# Patient Record
Sex: FEMALE | Race: BLACK OR AFRICAN AMERICAN | NOT HISPANIC OR LATINO | Employment: FULL TIME | URBAN - METROPOLITAN AREA
[De-identification: names, ages, dates, MRNs, and addresses within clinical notes are randomized per-mention and may not be internally consistent; named-entity substitution may affect disease eponyms.]

---

## 2024-01-06 ENCOUNTER — APPOINTMENT (OUTPATIENT)
Dept: CARDIOLOGY | Facility: HOSPITAL | Age: 24
End: 2024-01-06
Payer: COMMERCIAL

## 2024-01-06 ENCOUNTER — APPOINTMENT (OUTPATIENT)
Dept: RADIOLOGY | Facility: HOSPITAL | Age: 24
End: 2024-01-06
Payer: COMMERCIAL

## 2024-01-06 ENCOUNTER — HOSPITAL ENCOUNTER (OUTPATIENT)
Facility: HOSPITAL | Age: 24
Setting detail: OBSERVATION
Discharge: HOME | End: 2024-01-08
Attending: EMERGENCY MEDICINE
Payer: COMMERCIAL

## 2024-01-06 DIAGNOSIS — O21.0 HYPEREMESIS GRAVIDARUM (HHS-HCC): Primary | ICD-10-CM

## 2024-01-06 DIAGNOSIS — E87.6 HYPOKALEMIA: ICD-10-CM

## 2024-01-06 DIAGNOSIS — O99.891 ASYMPTOMATIC BACTERIURIA IN PREGNANCY (HHS-HCC): ICD-10-CM

## 2024-01-06 DIAGNOSIS — E83.42 HYPOMAGNESEMIA: ICD-10-CM

## 2024-01-06 DIAGNOSIS — R82.71 ASYMPTOMATIC BACTERIURIA IN PREGNANCY (HHS-HCC): ICD-10-CM

## 2024-01-06 DIAGNOSIS — N30.00 ACUTE CYSTITIS WITHOUT HEMATURIA: ICD-10-CM

## 2024-01-06 LAB
ABO GROUP (TYPE) IN BLOOD: NORMAL
ALBUMIN SERPL BCP-MCNC: 3.9 G/DL (ref 3.4–5)
ALP SERPL-CCNC: 51 U/L (ref 33–110)
ALT SERPL W P-5'-P-CCNC: 42 U/L (ref 7–45)
ANION GAP SERPL CALC-SCNC: 18 MMOL/L (ref 10–20)
APPEARANCE UR: ABNORMAL
AST SERPL W P-5'-P-CCNC: 47 U/L (ref 9–39)
B-HCG SERPL-ACNC: ABNORMAL MIU/ML
BACTERIA #/AREA URNS AUTO: ABNORMAL /HPF
BASOPHILS # BLD AUTO: 0.03 X10*3/UL (ref 0–0.1)
BASOPHILS NFR BLD AUTO: 0.3 %
BILIRUB SERPL-MCNC: 0.4 MG/DL (ref 0–1.2)
BILIRUB UR STRIP.AUTO-MCNC: NEGATIVE MG/DL
BUN SERPL-MCNC: 12 MG/DL (ref 6–23)
CALCIUM SERPL-MCNC: 8.9 MG/DL (ref 8.6–10.3)
CARDIAC TROPONIN I PNL SERPL HS: <3 NG/L (ref 0–13)
CHLORIDE SERPL-SCNC: 99 MMOL/L (ref 98–107)
CO2 SERPL-SCNC: 18 MMOL/L (ref 21–32)
COLOR UR: ABNORMAL
CREAT SERPL-MCNC: 0.44 MG/DL (ref 0.5–1.05)
D DIMER PPP FEU-MCNC: 223 NG/ML FEU
EOSINOPHIL # BLD AUTO: 0.01 X10*3/UL (ref 0–0.7)
EOSINOPHIL NFR BLD AUTO: 0.1 %
ERYTHROCYTE [DISTWIDTH] IN BLOOD BY AUTOMATED COUNT: 12.7 % (ref 11.5–14.5)
FLUAV RNA RESP QL NAA+PROBE: NOT DETECTED
FLUBV RNA RESP QL NAA+PROBE: NOT DETECTED
GFR SERPL CREATININE-BSD FRML MDRD: >90 ML/MIN/1.73M*2
GLUCOSE SERPL-MCNC: 155 MG/DL (ref 74–99)
GLUCOSE UR STRIP.AUTO-MCNC: ABNORMAL MG/DL
HCT VFR BLD AUTO: 32.5 % (ref 36–46)
HGB BLD-MCNC: 11.3 G/DL (ref 12–16)
IMM GRANULOCYTES # BLD AUTO: 0.03 X10*3/UL (ref 0–0.7)
IMM GRANULOCYTES NFR BLD AUTO: 0.3 % (ref 0–0.9)
KETONES UR STRIP.AUTO-MCNC: ABNORMAL MG/DL
LEUKOCYTE ESTERASE UR QL STRIP.AUTO: NEGATIVE
LYMPHOCYTES # BLD AUTO: 0.98 X10*3/UL (ref 1.2–4.8)
LYMPHOCYTES NFR BLD AUTO: 9.1 %
MAGNESIUM SERPL-MCNC: 1.5 MG/DL (ref 1.6–2.4)
MCH RBC QN AUTO: 28.4 PG (ref 26–34)
MCHC RBC AUTO-ENTMCNC: 34.8 G/DL (ref 32–36)
MCV RBC AUTO: 82 FL (ref 80–100)
MONOCYTES # BLD AUTO: 0.78 X10*3/UL (ref 0.1–1)
MONOCYTES NFR BLD AUTO: 7.3 %
MUCOUS THREADS #/AREA URNS AUTO: ABNORMAL /LPF
NEUTROPHILS # BLD AUTO: 8.9 X10*3/UL (ref 1.2–7.7)
NEUTROPHILS NFR BLD AUTO: 82.9 %
NITRITE UR QL STRIP.AUTO: NEGATIVE
NRBC BLD-RTO: 0 /100 WBCS (ref 0–0)
PH UR STRIP.AUTO: 5 [PH]
PLATELET # BLD AUTO: 184 X10*3/UL (ref 150–450)
POTASSIUM SERPL-SCNC: 2.7 MMOL/L (ref 3.5–5.3)
PROT SERPL-MCNC: 7.1 G/DL (ref 6.4–8.2)
PROT UR STRIP.AUTO-MCNC: ABNORMAL MG/DL
RBC # BLD AUTO: 3.98 X10*6/UL (ref 4–5.2)
RBC # UR STRIP.AUTO: NEGATIVE /UL
RBC #/AREA URNS AUTO: ABNORMAL /HPF
RH FACTOR (ANTIGEN D): NORMAL
SARS-COV-2 RNA RESP QL NAA+PROBE: NOT DETECTED
SODIUM SERPL-SCNC: 132 MMOL/L (ref 136–145)
SP GR UR STRIP.AUTO: 1.03
SQUAMOUS #/AREA URNS AUTO: ABNORMAL /HPF
UROBILINOGEN UR STRIP.AUTO-MCNC: 4 MG/DL
WBC # BLD AUTO: 10.7 X10*3/UL (ref 4.4–11.3)
WBC #/AREA URNS AUTO: ABNORMAL /HPF

## 2024-01-06 PROCEDURE — 99223 1ST HOSP IP/OBS HIGH 75: CPT

## 2024-01-06 PROCEDURE — 86901 BLOOD TYPING SEROLOGIC RH(D): CPT | Performed by: EMERGENCY MEDICINE

## 2024-01-06 PROCEDURE — 85379 FIBRIN DEGRADATION QUANT: CPT | Performed by: EMERGENCY MEDICINE

## 2024-01-06 PROCEDURE — 96361 HYDRATE IV INFUSION ADD-ON: CPT

## 2024-01-06 PROCEDURE — 87086 URINE CULTURE/COLONY COUNT: CPT | Mod: AHULAB | Performed by: EMERGENCY MEDICINE

## 2024-01-06 PROCEDURE — 2500000005 HC RX 250 GENERAL PHARMACY W/O HCPCS: Performed by: NURSE PRACTITIONER

## 2024-01-06 PROCEDURE — 36415 COLL VENOUS BLD VENIPUNCTURE: CPT | Performed by: EMERGENCY MEDICINE

## 2024-01-06 PROCEDURE — 83735 ASSAY OF MAGNESIUM: CPT | Performed by: EMERGENCY MEDICINE

## 2024-01-06 PROCEDURE — G0378 HOSPITAL OBSERVATION PER HR: HCPCS

## 2024-01-06 PROCEDURE — 85025 COMPLETE CBC W/AUTO DIFF WBC: CPT | Performed by: EMERGENCY MEDICINE

## 2024-01-06 PROCEDURE — 93005 ELECTROCARDIOGRAM TRACING: CPT

## 2024-01-06 PROCEDURE — 84702 CHORIONIC GONADOTROPIN TEST: CPT | Performed by: EMERGENCY MEDICINE

## 2024-01-06 PROCEDURE — 87636 SARSCOV2 & INF A&B AMP PRB: CPT | Performed by: EMERGENCY MEDICINE

## 2024-01-06 PROCEDURE — 96368 THER/DIAG CONCURRENT INF: CPT

## 2024-01-06 PROCEDURE — 2500000001 HC RX 250 WO HCPCS SELF ADMINISTERED DRUGS (ALT 637 FOR MEDICARE OP)

## 2024-01-06 PROCEDURE — 80053 COMPREHEN METABOLIC PANEL: CPT | Performed by: EMERGENCY MEDICINE

## 2024-01-06 PROCEDURE — 84484 ASSAY OF TROPONIN QUANT: CPT | Performed by: EMERGENCY MEDICINE

## 2024-01-06 PROCEDURE — 96375 TX/PRO/DX INJ NEW DRUG ADDON: CPT

## 2024-01-06 PROCEDURE — 2500000001 HC RX 250 WO HCPCS SELF ADMINISTERED DRUGS (ALT 637 FOR MEDICARE OP): Performed by: EMERGENCY MEDICINE

## 2024-01-06 PROCEDURE — 94760 N-INVAS EAR/PLS OXIMETRY 1: CPT

## 2024-01-06 PROCEDURE — 2500000004 HC RX 250 GENERAL PHARMACY W/ HCPCS (ALT 636 FOR OP/ED)

## 2024-01-06 PROCEDURE — 96366 THER/PROPH/DIAG IV INF ADDON: CPT

## 2024-01-06 PROCEDURE — 96365 THER/PROPH/DIAG IV INF INIT: CPT

## 2024-01-06 PROCEDURE — 99285 EMERGENCY DEPT VISIT HI MDM: CPT | Performed by: EMERGENCY MEDICINE

## 2024-01-06 PROCEDURE — 71045 X-RAY EXAM CHEST 1 VIEW: CPT | Mod: FOREIGN READ | Performed by: RADIOLOGY

## 2024-01-06 PROCEDURE — 81001 URINALYSIS AUTO W/SCOPE: CPT | Performed by: EMERGENCY MEDICINE

## 2024-01-06 PROCEDURE — 71045 X-RAY EXAM CHEST 1 VIEW: CPT | Mod: FR

## 2024-01-06 PROCEDURE — 2500000004 HC RX 250 GENERAL PHARMACY W/ HCPCS (ALT 636 FOR OP/ED): Performed by: EMERGENCY MEDICINE

## 2024-01-06 RX ORDER — METOPROLOL TARTRATE 1 MG/ML
5 INJECTION, SOLUTION INTRAVENOUS ONCE
Status: COMPLETED | OUTPATIENT
Start: 2024-01-06 | End: 2024-01-06

## 2024-01-06 RX ORDER — MAGNESIUM SULFATE HEPTAHYDRATE 40 MG/ML
2 INJECTION, SOLUTION INTRAVENOUS ONCE
Status: COMPLETED | OUTPATIENT
Start: 2024-01-06 | End: 2024-01-06

## 2024-01-06 RX ORDER — SODIUM CHLORIDE 9 MG/ML
250 INJECTION, SOLUTION INTRAVENOUS CONTINUOUS
Status: DISCONTINUED | OUTPATIENT
Start: 2024-01-06 | End: 2024-01-07

## 2024-01-06 RX ORDER — FOLIC ACID 1 MG/1
1 TABLET ORAL DAILY
Status: DISCONTINUED | OUTPATIENT
Start: 2024-01-06 | End: 2024-01-08 | Stop reason: HOSPADM

## 2024-01-06 RX ORDER — POTASSIUM CHLORIDE 20 MEQ/1
40 TABLET, EXTENDED RELEASE ORAL ONCE
Status: COMPLETED | OUTPATIENT
Start: 2024-01-06 | End: 2024-01-06

## 2024-01-06 RX ORDER — POTASSIUM CHLORIDE 14.9 MG/ML
20 INJECTION INTRAVENOUS
Status: COMPLETED | OUTPATIENT
Start: 2024-01-06 | End: 2024-01-06

## 2024-01-06 RX ORDER — METOCLOPRAMIDE HYDROCHLORIDE 5 MG/ML
10 INJECTION INTRAMUSCULAR; INTRAVENOUS EVERY 6 HOURS PRN
Status: DISCONTINUED | OUTPATIENT
Start: 2024-01-06 | End: 2024-01-08 | Stop reason: HOSPADM

## 2024-01-06 RX ORDER — CEPHALEXIN 500 MG/1
500 CAPSULE ORAL ONCE
Status: COMPLETED | OUTPATIENT
Start: 2024-01-06 | End: 2024-01-06

## 2024-01-06 RX ORDER — CEPHALEXIN 500 MG/1
500 CAPSULE ORAL EVERY 6 HOURS SCHEDULED
Status: DISCONTINUED | OUTPATIENT
Start: 2024-01-06 | End: 2024-01-07

## 2024-01-06 RX ORDER — METOCLOPRAMIDE 10 MG/1
10 TABLET ORAL EVERY 6 HOURS PRN
Status: DISCONTINUED | OUTPATIENT
Start: 2024-01-06 | End: 2024-01-08 | Stop reason: HOSPADM

## 2024-01-06 RX ORDER — ENOXAPARIN SODIUM 100 MG/ML
40 INJECTION SUBCUTANEOUS EVERY 24 HOURS
Status: DISCONTINUED | OUTPATIENT
Start: 2024-01-06 | End: 2024-01-07

## 2024-01-06 RX ORDER — ACETAMINOPHEN 325 MG/1
650 TABLET ORAL EVERY 6 HOURS PRN
Status: DISCONTINUED | OUTPATIENT
Start: 2024-01-06 | End: 2024-01-08 | Stop reason: HOSPADM

## 2024-01-06 RX ORDER — METOCLOPRAMIDE HYDROCHLORIDE 5 MG/ML
10 INJECTION INTRAMUSCULAR; INTRAVENOUS ONCE
Status: COMPLETED | OUTPATIENT
Start: 2024-01-06 | End: 2024-01-06

## 2024-01-06 RX ADMIN — MAGNESIUM SULFATE HEPTAHYDRATE 2 G: 40 INJECTION, SOLUTION INTRAVENOUS at 15:06

## 2024-01-06 RX ADMIN — CEPHALEXIN 500 MG: 500 CAPSULE ORAL at 16:57

## 2024-01-06 RX ADMIN — CEPHALEXIN 500 MG: 500 CAPSULE ORAL at 23:31

## 2024-01-06 RX ADMIN — FOLIC ACID 1 MG: 1 TABLET ORAL at 18:52

## 2024-01-06 RX ADMIN — SODIUM CHLORIDE 100 ML/HR: 9 INJECTION, SOLUTION INTRAVENOUS at 17:45

## 2024-01-06 RX ADMIN — SODIUM CHLORIDE, POTASSIUM CHLORIDE, SODIUM LACTATE AND CALCIUM CHLORIDE 1000 ML: 600; 310; 30; 20 INJECTION, SOLUTION INTRAVENOUS at 15:05

## 2024-01-06 RX ADMIN — POTASSIUM CHLORIDE 20 MEQ: 14.9 INJECTION, SOLUTION INTRAVENOUS at 17:45

## 2024-01-06 RX ADMIN — SODIUM CHLORIDE 100 ML/HR: 9 INJECTION, SOLUTION INTRAVENOUS at 23:31

## 2024-01-06 RX ADMIN — POTASSIUM CHLORIDE 40 MEQ: 1500 TABLET, EXTENDED RELEASE ORAL at 15:06

## 2024-01-06 RX ADMIN — METOPROLOL TARTRATE 5 MG: 5 INJECTION INTRAVENOUS at 23:31

## 2024-01-06 RX ADMIN — POTASSIUM CHLORIDE 20 MEQ: 14.9 INJECTION, SOLUTION INTRAVENOUS at 15:05

## 2024-01-06 RX ADMIN — METOCLOPRAMIDE 10 MG: 5 INJECTION, SOLUTION INTRAMUSCULAR; INTRAVENOUS at 13:28

## 2024-01-06 RX ADMIN — SODIUM CHLORIDE, POTASSIUM CHLORIDE, SODIUM LACTATE AND CALCIUM CHLORIDE 1000 ML: 600; 310; 30; 20 INJECTION, SOLUTION INTRAVENOUS at 13:28

## 2024-01-06 SDOH — SOCIAL STABILITY: SOCIAL INSECURITY: DO YOU FEEL ANYONE HAS EXPLOITED OR TAKEN ADVANTAGE OF YOU FINANCIALLY OR OF YOUR PERSONAL PROPERTY?: NO

## 2024-01-06 SDOH — SOCIAL STABILITY: SOCIAL INSECURITY: HAVE YOU HAD THOUGHTS OF HARMING ANYONE ELSE?: NO

## 2024-01-06 SDOH — SOCIAL STABILITY: SOCIAL INSECURITY: DO YOU FEEL UNSAFE GOING BACK TO THE PLACE WHERE YOU ARE LIVING?: NO

## 2024-01-06 SDOH — SOCIAL STABILITY: SOCIAL INSECURITY: DOES ANYONE TRY TO KEEP YOU FROM HAVING/CONTACTING OTHER FRIENDS OR DOING THINGS OUTSIDE YOUR HOME?: NO

## 2024-01-06 SDOH — SOCIAL STABILITY: SOCIAL INSECURITY: HAS ANYONE EVER THREATENED TO HURT YOUR FAMILY OR YOUR PETS?: NO

## 2024-01-06 SDOH — SOCIAL STABILITY: SOCIAL INSECURITY: ARE THERE ANY APPARENT SIGNS OF INJURIES/BEHAVIORS THAT COULD BE RELATED TO ABUSE/NEGLECT?: NO

## 2024-01-06 SDOH — SOCIAL STABILITY: SOCIAL INSECURITY: ABUSE: ADULT

## 2024-01-06 SDOH — SOCIAL STABILITY: SOCIAL INSECURITY: ARE YOU OR HAVE YOU BEEN THREATENED OR ABUSED PHYSICALLY, EMOTIONALLY, OR SEXUALLY BY ANYONE?: NO

## 2024-01-06 SDOH — SOCIAL STABILITY: SOCIAL INSECURITY: WERE YOU ABLE TO COMPLETE ALL THE BEHAVIORAL HEALTH SCREENINGS?: YES

## 2024-01-06 ASSESSMENT — VISUAL ACUITY: OU: 1

## 2024-01-06 ASSESSMENT — COLUMBIA-SUICIDE SEVERITY RATING SCALE - C-SSRS
1. IN THE PAST MONTH, HAVE YOU WISHED YOU WERE DEAD OR WISHED YOU COULD GO TO SLEEP AND NOT WAKE UP?: NO
2. HAVE YOU ACTUALLY HAD ANY THOUGHTS OF KILLING YOURSELF?: NO
6. HAVE YOU EVER DONE ANYTHING, STARTED TO DO ANYTHING, OR PREPARED TO DO ANYTHING TO END YOUR LIFE?: NO

## 2024-01-06 ASSESSMENT — COGNITIVE AND FUNCTIONAL STATUS - GENERAL
MOBILITY SCORE: 24
MOBILITY SCORE: 24
DAILY ACTIVITIY SCORE: 24
DAILY ACTIVITIY SCORE: 24
PATIENT BASELINE BEDBOUND: NO

## 2024-01-06 ASSESSMENT — ACTIVITIES OF DAILY LIVING (ADL)
GROOMING: INDEPENDENT
JUDGMENT_ADEQUATE_SAFELY_COMPLETE_DAILY_ACTIVITIES: YES
HEARING - RIGHT EAR: FUNCTIONAL
TOILETING: INDEPENDENT
FEEDING YOURSELF: INDEPENDENT
BATHING: INDEPENDENT
ADEQUATE_TO_COMPLETE_ADL: YES
LACK_OF_TRANSPORTATION: NO
LACK_OF_TRANSPORTATION: NO
DRESSING YOURSELF: INDEPENDENT
PATIENT'S MEMORY ADEQUATE TO SAFELY COMPLETE DAILY ACTIVITIES?: YES
HEARING - LEFT EAR: FUNCTIONAL
WALKS IN HOME: INDEPENDENT

## 2024-01-06 ASSESSMENT — ENCOUNTER SYMPTOMS
APPETITE CHANGE: 1
DIZZINESS: 1
DIARRHEA: 1
ALLERGIC/IMMUNOLOGIC NEGATIVE: 1
HEMATOLOGIC/LYMPHATIC NEGATIVE: 1
NAUSEA: 1
MUSCULOSKELETAL NEGATIVE: 1
ENDOCRINE NEGATIVE: 1
EYES NEGATIVE: 1
PSYCHIATRIC NEGATIVE: 1
SHORTNESS OF BREATH: 1

## 2024-01-06 ASSESSMENT — LIFESTYLE VARIABLES
HOW OFTEN DO YOU HAVE A DRINK CONTAINING ALCOHOL: NEVER
AUDIT-C TOTAL SCORE: 0
PRESCIPTION_ABUSE_PAST_12_MONTHS: NO
HOW MANY STANDARD DRINKS CONTAINING ALCOHOL DO YOU HAVE ON A TYPICAL DAY: PATIENT DOES NOT DRINK
SUBSTANCE_ABUSE_PAST_12_MONTHS: NO
AUDIT-C TOTAL SCORE: 0
HOW OFTEN DO YOU HAVE 6 OR MORE DRINKS ON ONE OCCASION: NEVER
SKIP TO QUESTIONS 9-10: 1

## 2024-01-06 ASSESSMENT — PATIENT HEALTH QUESTIONNAIRE - PHQ9
2. FEELING DOWN, DEPRESSED OR HOPELESS: NOT AT ALL
1. LITTLE INTEREST OR PLEASURE IN DOING THINGS: NOT AT ALL
SUM OF ALL RESPONSES TO PHQ9 QUESTIONS 1 & 2: 0

## 2024-01-06 ASSESSMENT — PAIN SCALES - GENERAL: PAINLEVEL_OUTOF10: 0 - NO PAIN

## 2024-01-06 NOTE — ED PROVIDER NOTES
History/Exam limitations: none.   Additional history was obtained from patient.          HPI:    India Naidu is a 23 y.o. female PMH preeclampsia in prior pregnancy, -0-1-0 with LMP in early November presenting with chest pain, shortness of breath, lightheadedness, fatigue, nausea and vomiting times approximately 2 weeks.  Over the last week she has had progressive shortness of breath, midsternal chest pain.  Initially thought she was having some heartburn and nausea vomiting from pregnancy.  Has been unable to tolerate p.o. over the last 2 weeks.  States that she feels markedly short of breath with very minimal exertion such as just across the room.  No headache, vision changes, hemoptysis, unilateral leg swelling.  Has not had vaginal bleeding.  Is unsure of her exact last menstrual period but states it was early November.  States that she had a miscarriage early  and had preeclampsia with her prior pregnancy but has a healthy daughter.  States that she is considering not moving forward with the pregnancy and seeing OB in 2 days to discuss this.          Physical Exam:  ED Triage Vitals [24 1302]   Temp Heart Rate Resp BP   37 °C (98.6 °F) (!) 122 16 112/78      SpO2 Temp src Heart Rate Source Patient Position   98 % -- -- --      BP Location FiO2 (%)     Left arm --        GEN:      Alert, NAD  Eyes:       PERRL, EOMI  HENT:      NC/AT, OP clear, airway patent, MM  CV:      RRR, no MRG, no LE pitting edema, 2+ radial and pedal pulses  PULM:     CTAB, no w/r/r, easy WOB, symmetric chest rise  ABD:      Soft, NT, ND, no masses, BS +  :       No CVA TTP  NEURO:   A&Ox3, no focal deficits    MSK:      FROM, no joint deformities or swelling, no e/o trauma  SKIN:       Warm and dry  PSYCH:    Appropriate mood and affect         MDM/ED Course:   India Naidu is a 23 y.o. female PMH preeclampsia in prior pregnancy, -0-1-0 with LMP in early November presenting with chest pain, shortness of breath,  lightheadedness, fatigue, nausea and vomiting times approximately 2 weeks.  Triage vitals markable for heart rates in the 120s.  Blood pressure stable and satting well on room air.  Differential includes ACS, PNA, PE, aortic dissection/pathology, pneumothorax, pericardial effusion, pericarditis, myocarditis, GERD, musculoskeletal pain, pleurisy.  Differential includes symptoms secondary to dehydration in setting of recurrent vomiting in setting of pregnancy.  IV placed and labs drawn.  IV fluids initiated.  Patient was given 10 mg IV Reglan.  Hemoglobin 11.3 and no leukocytosis on CBC.  D-dimer obtained and negative, unlikely PE given low risk Wells.  Troponin negative unlikely ACS given age and risk factors.  Chemistry obtained and hypokalemia noted to 2.7.  Magnesium 1.5.  IV and p.o. potassium repletion ordered, IV magnesium repletion ordered.  hCG 334,000.  Urinalysis with 2+ ketones and 2+ bacteria.  P.o. Keflex given given asymptomatic bacteria pregnancy.  COVID influenza negative.  Chest x-ray without acute findings per radiology read.  Patient remained tachycardic in the 120s, briefly improved to 110s after 2 L IV fluids.  No vaginal bleeding or lower abdominal discomfort.  Given persistent abnormal vital signs and severity of symptoms, patient hospitalized for continued IV hydration and management of vomiting of pregnancy.    EKG reviewed by me: 1:29 PM sinus tachycardia, rate 102, normal axis, normal intervals, no STEMI, appears similar to prior EKG from 2016.     Diagnoses as of 01/10/24 1404   Hyperemesis gravidarum   Hypomagnesemia   Hypokalemia   Asymptomatic bacteriuria in pregnancy         Chronic medical conditions affecting care listed in MDM. I independently reviewed imaging studies and agreed with radiology reads. I reviewed recent and relevant outside records including PCP notes, Prior discharge summaries, and prior radiology reports.    Procedure  Procedures    Diagnosis:   1.  Hypokalemia  2.   Hypomagnesemia  3.  Asymptomatic bacteriuria in pregnancy  3.  Dehydration  4.  Hyperemesis gravidarum    Dispo: Hospitalized in stable condition      Disclaimer: Portions of this note were dictated by speech recognition. An attempt at proof reading was made to minimize errors. Minor errors in transcription may be present.  Please call if questions.     Dutch Henry MD  01/10/24 1365

## 2024-01-06 NOTE — H&P
"History Of Present Illness  India Naidu is a 23 y.o. female presenting with persistent nausea/ vomiting and poor PO intake for 2 weeks.  Patient reports being approximately 8 weeks pregnant. -0-1-0 with LMP in early November . She found out that she was pregnant at the beginning of December, since finding out about the pregnancy she began having \"normal\" pregnancy symptoms, similar to her previous pregnancy, nausea and fatigue however, in the past 2 weeks she has had profound N/V and unable to keep anything down.  Also reports having shortness of breath, profound fatigue, chest pain and dizziness requiring her to sit down after being up for only 3 minutes or so. She reports these symptoms being much more intense than her first pregnancy. She notes that she had preeclampsia with her first pregnancy ( her daughter is now 1 years old).   No headache, vision changes, hemoptysis, unilateral leg swelling. Has not had vaginal bleeding. States that she had a miscarriage early  and had preeclampsia with her prior pregnancy but has a healthy daughter.  She has yet to see an OB with this pregnancy, her first appointment is scheduled for 2024.    ED work up: K: 2.7 Mag 1.5 CXR: No radiographic evidence of acute cardiopulmonary disease. Urinalysis: Hazy in appearance, protein 100 (2+), glucose 50 (1+), ketones 80 (2+), urobilinogen 4.0.  FLU A/B and Covid swabs were negative. Troponin <3, and d-dimer 223.       Past Medical History  She has a past medical history of Encounter for gynecological examination (general) (routine) without abnormal findings (2020), Encounter for gynecological examination (general) (routine) without abnormal findings (2020), and Unspecified maternal hypertension, first trimester.    Surgical History  She has no past surgical history on file.     Social History  She has no history on file for tobacco use, alcohol use, and drug use.    Family History  No family history on " file.     Allergies  Patient has no known allergies.    Review of Systems   Constitutional:  Positive for appetite change.   HENT: Negative.     Eyes: Negative.    Respiratory:  Positive for shortness of breath.    Cardiovascular:  Positive for chest pain.   Gastrointestinal:  Positive for diarrhea and nausea.   Endocrine: Negative.    Genitourinary: Negative.    Musculoskeletal: Negative.    Skin: Negative.    Allergic/Immunologic: Negative.    Neurological:  Positive for dizziness.   Hematological: Negative.    Psychiatric/Behavioral: Negative.     All other systems reviewed and are negative.       Physical Exam  Vitals and nursing note reviewed.   Constitutional:       Appearance: Normal appearance.   HENT:      Head: Normocephalic.      Nose: Nose normal.      Mouth/Throat:      Lips: Pink.      Mouth: Mucous membranes are moist.      Pharynx: Oropharynx is clear.   Eyes:      General: Lids are normal. Lids are everted, no foreign bodies appreciated. Vision grossly intact. Gaze aligned appropriately.      Extraocular Movements: Extraocular movements intact.      Conjunctiva/sclera: Conjunctivae normal.      Pupils: Pupils are equal, round, and reactive to light.   Neck:      Trachea: Trachea normal.   Cardiovascular:      Rate and Rhythm: Regular rhythm. Tachycardia present.      Pulses: Normal pulses.      Heart sounds: Normal heart sounds.   Pulmonary:      Effort: Pulmonary effort is normal.      Breath sounds: Normal breath sounds.   Abdominal:      General: Abdomen is flat. Bowel sounds are normal.      Palpations: Abdomen is soft.   Musculoskeletal:         General: Normal range of motion.      Cervical back: Full passive range of motion without pain, normal range of motion and neck supple.   Feet:      Right foot:      Skin integrity: Skin integrity normal.      Left foot:      Skin integrity: Skin integrity normal.   Skin:     General: Skin is warm and dry.      Capillary Refill: Capillary refill takes  less than 2 seconds.   Neurological:      General: No focal deficit present.      Mental Status: She is alert and oriented to person, place, and time. Mental status is at baseline.   Psychiatric:         Attention and Perception: Attention and perception normal.         Mood and Affect: Mood and affect normal.         Speech: Speech normal.         Behavior: Behavior normal. Behavior is cooperative.         Thought Content: Thought content normal.         Cognition and Memory: Cognition normal.         Judgment: Judgment normal.       Last Recorded Vitals  /72   Pulse (!) 116   Temp 37 °C (98.6 °F)   Resp 17   Wt 54.9 kg (121 lb 0.5 oz)   SpO2 100%     Scheduled medications  enoxaparin, 40 mg, subcutaneous, q24h  potassium chloride, 20 mEq, intravenous, q2h      Continuous medications  sodium chloride 0.9%, 100 mL/hr, Last Rate: 100 mL/hr (01/06/24 1745)      PRN medications  PRN medications: acetaminophen, metoclopramide **OR** metoclopramide    Results for orders placed or performed during the hospital encounter of 01/06/24 (from the past 24 hour(s))   ABO/Rh   Result Value Ref Range    ABO TYPE O     Rh TYPE POS    Human Chorionic Gonadotropin, Serum Quantitative   Result Value Ref Range    HCG, Beta-Quantitative 334,194 (H) <5 mIU/mL   CBC and Auto Differential   Result Value Ref Range    WBC 10.7 4.4 - 11.3 x10*3/uL    nRBC 0.0 0.0 - 0.0 /100 WBCs    RBC 3.98 (L) 4.00 - 5.20 x10*6/uL    Hemoglobin 11.3 (L) 12.0 - 16.0 g/dL    Hematocrit 32.5 (L) 36.0 - 46.0 %    MCV 82 80 - 100 fL    MCH 28.4 26.0 - 34.0 pg    MCHC 34.8 32.0 - 36.0 g/dL    RDW 12.7 11.5 - 14.5 %    Platelets 184 150 - 450 x10*3/uL    Neutrophils % 82.9 40.0 - 80.0 %    Immature Granulocytes %, Automated 0.3 0.0 - 0.9 %    Lymphocytes % 9.1 13.0 - 44.0 %    Monocytes % 7.3 2.0 - 10.0 %    Eosinophils % 0.1 0.0 - 6.0 %    Basophils % 0.3 0.0 - 2.0 %    Neutrophils Absolute 8.90 (H) 1.20 - 7.70 x10*3/uL    Immature Granulocytes  Absolute, Automated 0.03 0.00 - 0.70 x10*3/uL    Lymphocytes Absolute 0.98 (L) 1.20 - 4.80 x10*3/uL    Monocytes Absolute 0.78 0.10 - 1.00 x10*3/uL    Eosinophils Absolute 0.01 0.00 - 0.70 x10*3/uL    Basophils Absolute 0.03 0.00 - 0.10 x10*3/uL   Magnesium   Result Value Ref Range    Magnesium 1.50 (L) 1.60 - 2.40 mg/dL   Comprehensive metabolic panel   Result Value Ref Range    Glucose 155 (H) 74 - 99 mg/dL    Sodium 132 (L) 136 - 145 mmol/L    Potassium 2.7 (LL) 3.5 - 5.3 mmol/L    Chloride 99 98 - 107 mmol/L    Bicarbonate 18 (L) 21 - 32 mmol/L    Anion Gap 18 10 - 20 mmol/L    Urea Nitrogen 12 6 - 23 mg/dL    Creatinine 0.44 (L) 0.50 - 1.05 mg/dL    eGFR >90 >60 mL/min/1.73m*2    Calcium 8.9 8.6 - 10.3 mg/dL    Albumin 3.9 3.4 - 5.0 g/dL    Alkaline Phosphatase 51 33 - 110 U/L    Total Protein 7.1 6.4 - 8.2 g/dL    AST 47 (H) 9 - 39 U/L    Bilirubin, Total 0.4 0.0 - 1.2 mg/dL    ALT 42 7 - 45 U/L   D-dimer, VTE Exclusion   Result Value Ref Range    D-Dimer, Quantitative VTE Exclusion 223 <=500 ng/mL FEU   Troponin I, High Sensitivity   Result Value Ref Range    Troponin I, High Sensitivity <3 0 - 13 ng/L   Sars-CoV-2 and Influenza A/B PCR   Result Value Ref Range    Flu A Result Not Detected Not Detected    Flu B Result Not Detected Not Detected    Coronavirus 2019, PCR Not Detected Not Detected   Urinalysis with Reflex Culture and Microscopic   Result Value Ref Range    Color, Urine Valentine (N) Straw, Yellow    Appearance, Urine Hazy (N) Clear    Specific Gravity, Urine 1.031 1.005 - 1.035    pH, Urine 5.0 5.0, 5.5, 6.0, 6.5, 7.0, 7.5, 8.0    Protein, Urine 100 (2+) (N) NEGATIVE mg/dL    Glucose, Urine 50 (1+) (A) NEGATIVE mg/dL    Blood, Urine NEGATIVE NEGATIVE    Ketones, Urine 80 (2+) (A) NEGATIVE mg/dL    Bilirubin, Urine NEGATIVE NEGATIVE    Urobilinogen, Urine 4.0 (N) <2.0 mg/dL    Nitrite, Urine NEGATIVE NEGATIVE    Leukocyte Esterase, Urine NEGATIVE NEGATIVE   Urinalysis Microscopic   Result Value Ref  Range    WBC, Urine 6-10 (A) 1-5, NONE /HPF    RBC, Urine 3-5 NONE, 1-2, 3-5 /HPF    Squamous Epithelial Cells, Urine 1-9 (SPARSE) Reference range not established. /HPF    Bacteria, Urine 2+ (A) NONE SEEN /HPF    Mucus, Urine 4+ Reference range not established. /LPF     XR chest 1 view    Result Date: 1/6/2024  STUDY: Chest Radiograph;  1/6/2024, 2:06PM INDICATION: Chest pain and shortness of breath. COMPARISON: None Available ACCESSION NUMBER(S): [id number] ORDERING CLINICIAN: GABO GARIBAY TECHNIQUE:  Frontal chest was obtained at 14:05 hours. FINDINGS: CARDIOMEDIASTINAL SILHOUETTE: Cardiomediastinal silhouette is normal in size and configuration.  LUNGS: Lungs are clear.  ABDOMEN: No remarkable upper abdominal findings.  BONES: No acute osseous changes.    No radiographic evidence of acute cardiopulmonary disease. Signed by Mark Olsen MD      Assessment/Plan   Active Problems:  There are no active Hospital Problems.    Hyperemersis gravidarum   - s/p 2L Lactated ringers   - Continue NS @100ml   - Clear liquid diet as tolerated, advance diet as able  - Antiemetics PRN    Chest pain/ SOB  - likely secondary to fluid imbalance  - troponin <3  - ddimer 223  - ECG: Sinus tachycardia   - Continue IVF, monitor on telemetry     Electrolyte imbalance  - Hypokalemia: 2.7  - Magnesium: 1.5  - Monitor and replace as needed  - Daily CMP, daily magnesium   - Telemetry to monitor for arrhythmias     UTI   - Urinalysis: Hazy in appearance, protein 100 (2+), glucose 50 (1+), ketones 80 (2+), urobilinogen 4.0.   - Urine culture in progress  - s/p cephalexin in ED  - Continue abx, follow culture    Discharge disposition  - Home when medically stable     I spent minutes with this patient and/or family.  Greater than 50% of this time was spent in counseling and/or coordination of care.      Cecille Hickey, APRN-CNP

## 2024-01-07 LAB
ALBUMIN SERPL BCP-MCNC: 3.1 G/DL (ref 3.4–5)
ALP SERPL-CCNC: 43 U/L (ref 33–110)
ALT SERPL W P-5'-P-CCNC: 46 U/L (ref 7–45)
ANION GAP SERPL CALC-SCNC: 9 MMOL/L (ref 10–20)
AST SERPL W P-5'-P-CCNC: 48 U/L (ref 9–39)
BILIRUB SERPL-MCNC: 0.3 MG/DL (ref 0–1.2)
BUN SERPL-MCNC: 4 MG/DL (ref 6–23)
CALCIUM SERPL-MCNC: 7.8 MG/DL (ref 8.6–10.3)
CHLORIDE SERPL-SCNC: 107 MMOL/L (ref 98–107)
CO2 SERPL-SCNC: 23 MMOL/L (ref 21–32)
CREAT SERPL-MCNC: 0.33 MG/DL (ref 0.5–1.05)
ERYTHROCYTE [DISTWIDTH] IN BLOOD BY AUTOMATED COUNT: 13.2 % (ref 11.5–14.5)
GFR SERPL CREATININE-BSD FRML MDRD: >90 ML/MIN/1.73M*2
GLUCOSE SERPL-MCNC: 92 MG/DL (ref 74–99)
HCT VFR BLD AUTO: 27.2 % (ref 36–46)
HGB BLD-MCNC: 9.1 G/DL (ref 12–16)
MAGNESIUM SERPL-MCNC: 1.6 MG/DL (ref 1.6–2.4)
MCH RBC QN AUTO: 28.2 PG (ref 26–34)
MCHC RBC AUTO-ENTMCNC: 33.5 G/DL (ref 32–36)
MCV RBC AUTO: 84 FL (ref 80–100)
NRBC BLD-RTO: 0 /100 WBCS (ref 0–0)
PLATELET # BLD AUTO: 166 X10*3/UL (ref 150–450)
POTASSIUM SERPL-SCNC: 3.7 MMOL/L (ref 3.5–5.3)
PROT SERPL-MCNC: 5.5 G/DL (ref 6.4–8.2)
RBC # BLD AUTO: 3.23 X10*6/UL (ref 4–5.2)
SODIUM SERPL-SCNC: 135 MMOL/L (ref 136–145)
WBC # BLD AUTO: 8.5 X10*3/UL (ref 4.4–11.3)

## 2024-01-07 PROCEDURE — 2500000001 HC RX 250 WO HCPCS SELF ADMINISTERED DRUGS (ALT 637 FOR MEDICARE OP): Performed by: INTERNAL MEDICINE

## 2024-01-07 PROCEDURE — 2500000004 HC RX 250 GENERAL PHARMACY W/ HCPCS (ALT 636 FOR OP/ED): Performed by: NURSE PRACTITIONER

## 2024-01-07 PROCEDURE — 96376 TX/PRO/DX INJ SAME DRUG ADON: CPT

## 2024-01-07 PROCEDURE — 99233 SBSQ HOSP IP/OBS HIGH 50: CPT | Performed by: NURSE PRACTITIONER

## 2024-01-07 PROCEDURE — 96361 HYDRATE IV INFUSION ADD-ON: CPT

## 2024-01-07 PROCEDURE — 2500000001 HC RX 250 WO HCPCS SELF ADMINISTERED DRUGS (ALT 637 FOR MEDICARE OP): Performed by: NURSE PRACTITIONER

## 2024-01-07 PROCEDURE — 36415 COLL VENOUS BLD VENIPUNCTURE: CPT

## 2024-01-07 PROCEDURE — 2500000004 HC RX 250 GENERAL PHARMACY W/ HCPCS (ALT 636 FOR OP/ED)

## 2024-01-07 PROCEDURE — 83735 ASSAY OF MAGNESIUM: CPT

## 2024-01-07 PROCEDURE — G0378 HOSPITAL OBSERVATION PER HR: HCPCS

## 2024-01-07 PROCEDURE — 2500000001 HC RX 250 WO HCPCS SELF ADMINISTERED DRUGS (ALT 637 FOR MEDICARE OP)

## 2024-01-07 PROCEDURE — 84075 ASSAY ALKALINE PHOSPHATASE: CPT

## 2024-01-07 PROCEDURE — 85027 COMPLETE CBC AUTOMATED: CPT

## 2024-01-07 RX ORDER — HALOPERIDOL 1 MG/1
2 TABLET ORAL ONCE
Status: COMPLETED | OUTPATIENT
Start: 2024-01-07 | End: 2024-01-07

## 2024-01-07 RX ORDER — SODIUM CHLORIDE, SODIUM LACTATE, POTASSIUM CHLORIDE, CALCIUM CHLORIDE 600; 310; 30; 20 MG/100ML; MG/100ML; MG/100ML; MG/100ML
100 INJECTION, SOLUTION INTRAVENOUS CONTINUOUS
Status: DISCONTINUED | OUTPATIENT
Start: 2024-01-07 | End: 2024-01-08 | Stop reason: HOSPADM

## 2024-01-07 RX ORDER — CEPHALEXIN 500 MG/1
500 CAPSULE ORAL EVERY 12 HOURS SCHEDULED
Status: DISCONTINUED | OUTPATIENT
Start: 2024-01-07 | End: 2024-01-08

## 2024-01-07 RX ADMIN — CEPHALEXIN 500 MG: 500 CAPSULE ORAL at 05:27

## 2024-01-07 RX ADMIN — FOLIC ACID 1 MG: 1 TABLET ORAL at 09:57

## 2024-01-07 RX ADMIN — HALOPERIDOL 2 MG: 1 TABLET ORAL at 22:58

## 2024-01-07 RX ADMIN — METOCLOPRAMIDE 10 MG: 10 TABLET ORAL at 22:58

## 2024-01-07 RX ADMIN — SODIUM CHLORIDE, POTASSIUM CHLORIDE, SODIUM LACTATE AND CALCIUM CHLORIDE 100 ML/HR: 600; 310; 30; 20 INJECTION, SOLUTION INTRAVENOUS at 18:49

## 2024-01-07 RX ADMIN — SODIUM CHLORIDE 250 ML/HR: 9 INJECTION, SOLUTION INTRAVENOUS at 10:01

## 2024-01-07 RX ADMIN — SODIUM CHLORIDE 250 ML/HR: 9 INJECTION, SOLUTION INTRAVENOUS at 14:29

## 2024-01-07 RX ADMIN — METOCLOPRAMIDE 10 MG: 5 INJECTION, SOLUTION INTRAMUSCULAR; INTRAVENOUS at 04:20

## 2024-01-07 RX ADMIN — CEPHALEXIN 500 MG: 500 CAPSULE ORAL at 20:48

## 2024-01-07 ASSESSMENT — COGNITIVE AND FUNCTIONAL STATUS - GENERAL
DAILY ACTIVITIY SCORE: 24
MOBILITY SCORE: 24

## 2024-01-07 ASSESSMENT — PAIN SCALES - GENERAL
PAINLEVEL_OUTOF10: 0 - NO PAIN

## 2024-01-07 ASSESSMENT — PAIN - FUNCTIONAL ASSESSMENT: PAIN_FUNCTIONAL_ASSESSMENT: 0-10

## 2024-01-07 NOTE — CARE PLAN
Problem: Pain  Goal: My pain/discomfort is manageable  Outcome: Progressing     Problem: Safety  Goal: Patient will be injury free during hospitalization  Outcome: Progressing  Goal: I will remain free of falls  Outcome: Progressing     Problem: Daily Care  Goal: Daily care needs are met  Outcome: Progressing     Problem: Psychosocial Needs  Goal: Demonstrates ability to cope with hospitalization/illness  Outcome: Progressing  Goal: Collaborate with me, my family, and caregiver to identify my specific goals  Outcome: Progressing  Flowsheets (Taken 1/6/2024 1812)  Cultural Requests During Hospitalization: n/a  Spiritual Requests During Hospitalization: n/a     Problem: Discharge Barriers  Goal: My discharge needs are met  Outcome: Progressing   The patient's goals for the shift include      The clinical goals for the shift include   refrain from falls

## 2024-01-07 NOTE — PROGRESS NOTES
India Naidu is a 23 y.o. female on day 0 of admission presenting with Hyperemesis gravidarum.    Subjective   She states she is feeling much better heart rate is still little bit elevated she is able to keep down fluids without vomiting.     ROS  Gen: No fatigue, fever, sweats.  Head: No headache, trauma.  Eyes: No vision loss, double vision, drainage, eye pain.  ENT: No hearing changes, pain, epistaxis, congestion  Cardiac: No chest pain  Pulmonary: No shortness of breath,  pleuritic pain,   Heme/lymph: No swollen glands  GI: No abdominal pain, POSITIVE nausea, POSITIVE vomiting, diarrhea  : No  dysuria, frequency, urgency, hematuria  Musculoskeletal: No limb pain, joint pain, back pain, joint swelling or stiffness.  Skin: No rashes, pruritus, lumps, lesions.  Neuro: No Numbness, tingling, or weakness.  Psych: No  anxiety     Review of systems is otherwise negative unless stated above or in history of present illness.    Objective     Physical Exam  General: Vital signs stable, Pt is alert, no acute distress  Eyes: Conjunctiva normal, PERRL, EOMs intact  HENMT: Normocephalic, atraumatic, external ears and nose normal, no scars or masses.  No mastoid tenderness. Trachea is midline. No meningeal signs, negative Kernig and Brudzinski, moves neck freely.  No sinus tenderness  Resp: Respiratory effort is normal, no retractions, no stridor. Lungs CTA, no wheezes or rhonchi  CV: Slight tachycardia  GI: No abdominal pain on palpation, no further nausea and vomiting  : voiding  Skin: No evidence of trauma, skin is warm and dry. No rashes, lesions or ulcers.  Skel: full range of motion of upper and lower extremities.   Neuro: Normal gait, CN II-XII intact, no motor or sensory changes.  Psych: Alert and oriented ×3, judgment is appropriate, normal mood and affect     Last Recorded Vitals  Blood pressure 106/62, pulse (!) 112, temperature 36.9 °C (98.5 °F), temperature source Temporal, resp. rate 20, height 1.524 m (5'),  weight 54.9 kg (121 lb 0.5 oz), SpO2 100 %.  Intake/Output last 3 Shifts:  I/O last 3 completed shifts:  In: 1400 (25.5 mL/kg) [I.V.:1300 (23.7 mL/kg); IV Piggyback:100]  Out: - (0 mL/kg)   Weight: 54.9 kg     Relevant Results  Results for orders placed or performed during the hospital encounter of 01/06/24 (from the past 24 hour(s))   ABO/Rh   Result Value Ref Range    ABO TYPE O     Rh TYPE POS    Human Chorionic Gonadotropin, Serum Quantitative   Result Value Ref Range    HCG, Beta-Quantitative 334,194 (H) <5 mIU/mL   CBC and Auto Differential   Result Value Ref Range    WBC 10.7 4.4 - 11.3 x10*3/uL    nRBC 0.0 0.0 - 0.0 /100 WBCs    RBC 3.98 (L) 4.00 - 5.20 x10*6/uL    Hemoglobin 11.3 (L) 12.0 - 16.0 g/dL    Hematocrit 32.5 (L) 36.0 - 46.0 %    MCV 82 80 - 100 fL    MCH 28.4 26.0 - 34.0 pg    MCHC 34.8 32.0 - 36.0 g/dL    RDW 12.7 11.5 - 14.5 %    Platelets 184 150 - 450 x10*3/uL    Neutrophils % 82.9 40.0 - 80.0 %    Immature Granulocytes %, Automated 0.3 0.0 - 0.9 %    Lymphocytes % 9.1 13.0 - 44.0 %    Monocytes % 7.3 2.0 - 10.0 %    Eosinophils % 0.1 0.0 - 6.0 %    Basophils % 0.3 0.0 - 2.0 %    Neutrophils Absolute 8.90 (H) 1.20 - 7.70 x10*3/uL    Immature Granulocytes Absolute, Automated 0.03 0.00 - 0.70 x10*3/uL    Lymphocytes Absolute 0.98 (L) 1.20 - 4.80 x10*3/uL    Monocytes Absolute 0.78 0.10 - 1.00 x10*3/uL    Eosinophils Absolute 0.01 0.00 - 0.70 x10*3/uL    Basophils Absolute 0.03 0.00 - 0.10 x10*3/uL   Magnesium   Result Value Ref Range    Magnesium 1.50 (L) 1.60 - 2.40 mg/dL   Comprehensive metabolic panel   Result Value Ref Range    Glucose 155 (H) 74 - 99 mg/dL    Sodium 132 (L) 136 - 145 mmol/L    Potassium 2.7 (LL) 3.5 - 5.3 mmol/L    Chloride 99 98 - 107 mmol/L    Bicarbonate 18 (L) 21 - 32 mmol/L    Anion Gap 18 10 - 20 mmol/L    Urea Nitrogen 12 6 - 23 mg/dL    Creatinine 0.44 (L) 0.50 - 1.05 mg/dL    eGFR >90 >60 mL/min/1.73m*2    Calcium 8.9 8.6 - 10.3 mg/dL    Albumin 3.9 3.4 - 5.0 g/dL     Alkaline Phosphatase 51 33 - 110 U/L    Total Protein 7.1 6.4 - 8.2 g/dL    AST 47 (H) 9 - 39 U/L    Bilirubin, Total 0.4 0.0 - 1.2 mg/dL    ALT 42 7 - 45 U/L   D-dimer, VTE Exclusion   Result Value Ref Range    D-Dimer, Quantitative VTE Exclusion 223 <=500 ng/mL FEU   Troponin I, High Sensitivity   Result Value Ref Range    Troponin I, High Sensitivity <3 0 - 13 ng/L   Sars-CoV-2 and Influenza A/B PCR   Result Value Ref Range    Flu A Result Not Detected Not Detected    Flu B Result Not Detected Not Detected    Coronavirus 2019, PCR Not Detected Not Detected   Urinalysis with Reflex Culture and Microscopic   Result Value Ref Range    Color, Urine Valentine (N) Straw, Yellow    Appearance, Urine Hazy (N) Clear    Specific Gravity, Urine 1.031 1.005 - 1.035    pH, Urine 5.0 5.0, 5.5, 6.0, 6.5, 7.0, 7.5, 8.0    Protein, Urine 100 (2+) (N) NEGATIVE mg/dL    Glucose, Urine 50 (1+) (A) NEGATIVE mg/dL    Blood, Urine NEGATIVE NEGATIVE    Ketones, Urine 80 (2+) (A) NEGATIVE mg/dL    Bilirubin, Urine NEGATIVE NEGATIVE    Urobilinogen, Urine 4.0 (N) <2.0 mg/dL    Nitrite, Urine NEGATIVE NEGATIVE    Leukocyte Esterase, Urine NEGATIVE NEGATIVE   Urinalysis Microscopic   Result Value Ref Range    WBC, Urine 6-10 (A) 1-5, NONE /HPF    RBC, Urine 3-5 NONE, 1-2, 3-5 /HPF    Squamous Epithelial Cells, Urine 1-9 (SPARSE) Reference range not established. /HPF    Bacteria, Urine 2+ (A) NONE SEEN /HPF    Mucus, Urine 4+ Reference range not established. /LPF   CBC   Result Value Ref Range    WBC 8.5 4.4 - 11.3 x10*3/uL    nRBC 0.0 0.0 - 0.0 /100 WBCs    RBC 3.23 (L) 4.00 - 5.20 x10*6/uL    Hemoglobin 9.1 (L) 12.0 - 16.0 g/dL    Hematocrit 27.2 (L) 36.0 - 46.0 %    MCV 84 80 - 100 fL    MCH 28.2 26.0 - 34.0 pg    MCHC 33.5 32.0 - 36.0 g/dL    RDW 13.2 11.5 - 14.5 %    Platelets 166 150 - 450 x10*3/uL   Comprehensive metabolic panel   Result Value Ref Range    Glucose 92 74 - 99 mg/dL    Sodium 135 (L) 136 - 145 mmol/L    Potassium 3.7 3.5  - 5.3 mmol/L    Chloride 107 98 - 107 mmol/L    Bicarbonate 23 21 - 32 mmol/L    Anion Gap 9 (L) 10 - 20 mmol/L    Urea Nitrogen 4 (L) 6 - 23 mg/dL    Creatinine 0.33 (L) 0.50 - 1.05 mg/dL    eGFR >90 >60 mL/min/1.73m*2    Calcium 7.8 (L) 8.6 - 10.3 mg/dL    Albumin 3.1 (L) 3.4 - 5.0 g/dL    Alkaline Phosphatase 43 33 - 110 U/L    Total Protein 5.5 (L) 6.4 - 8.2 g/dL    AST 48 (H) 9 - 39 U/L    Bilirubin, Total 0.3 0.0 - 1.2 mg/dL    ALT 46 (H) 7 - 45 U/L   Magnesium   Result Value Ref Range    Magnesium 1.60 1.60 - 2.40 mg/dL       Imaging Results  XR chest 1 view    Result Date: 1/6/2024  STUDY: Chest Radiograph;  1/6/2024, 2:06PM INDICATION: Chest pain and shortness of breath. COMPARISON: None Available ACCESSION NUMBER(S): [id number] ORDERING CLINICIAN: GABO GARIBAY TECHNIQUE:  Frontal chest was obtained at 14:05 hours. FINDINGS: CARDIOMEDIASTINAL SILHOUETTE: Cardiomediastinal silhouette is normal in size and configuration.  LUNGS: Lungs are clear.  ABDOMEN: No remarkable upper abdominal findings.  BONES: No acute osseous changes.    No radiographic evidence of acute cardiopulmonary disease. Signed by Mark Olsen MD      Medications:  cephalexin, 500 mg, oral, q12h CARLY  folic acid, 1 mg, oral, Daily       PRN medications: acetaminophen, metoclopramide **OR** metoclopramide     Assessment/Plan   Active Problems:  There are no active Hospital Problems.     Hyperemersis gravidarum   - s/p 2L Lactated ringers   - Continue NS @250ml HR  - Clear liquid diet as tolerated, advance diet as able  - Antiemetics PRN     Chest pain/ SOB  - likely secondary to fluid imbalance  - troponin <3  - ddimer 223  - ECG: Sinus tachycardia   - Continue IVF, monitor on telemetry      Electrolyte imbalance  - Hypokalemia: 2.7>3.7  - Magnesium: 1.5>1.60  - Monitor and replace as needed  - Daily CMP, daily magnesium   - Telemetry to monitor for arrhythmias      UTI   - Urinalysis: Hazy in appearance, protein 100 (2+), glucose  50 (1+), ketones 80 (2+), urobilinogen 4.0.   - Urine culture in progress  - s/p cephalexin in ED  - Continue abx, follow culture     Discharge disposition  - Home when medically stable       Labs/Testing reviewed    Interdisciplinary team rounding completed with hospitalist, nurse, TCC    NP discussed plan and lab/testing results with Dr. Barrientos    Will continue to hydrate and probably discharge later this afternoon  Will send home on ABX for leukocytosis and urine she will follow-up with her OB/GYN    * 45  minutes total spent on patient's care today; >50% time spent on counseling/coordination of care    Eleonora Rivera, APRN-CNP

## 2024-01-07 NOTE — CARE PLAN
The patient's goals for the shift include      The clinical goals for the shift include nausea resolution      Problem: Pain  Goal: My pain/discomfort is manageable  Outcome: Progressing     Problem: Safety  Goal: Patient will be injury free during hospitalization  Outcome: Progressing  Goal: I will remain free of falls  Outcome: Progressing     Problem: Daily Care  Goal: Daily care needs are met  Outcome: Progressing     Problem: Psychosocial Needs  Goal: Demonstrates ability to cope with hospitalization/illness  Outcome: Progressing  Goal: Collaborate with me, my family, and caregiver to identify my specific goals  Outcome: Progressing     Problem: Discharge Barriers  Goal: My discharge needs are met  Outcome: Progressing

## 2024-01-07 NOTE — SIGNIFICANT EVENT
Patient continues to have dizziness and tachycardia when rising.  Reached out to GYN however they did not answer any back.  Dr. Barrientos asked us to reach out to them to see if a CT chest to rule out PE is okay and pregnancy at 8 weeks.  Please reach back out to them tomorrow to see if we should get the CT scan.  Patient was kept overnight for additional hydration and monitoring.

## 2024-01-07 NOTE — PROGRESS NOTES
Transitional Care Coordination Progress Note:  Plan per Medical/Surgical team: treatment of N/V, hyperemesis with reglan, IV fluids, regular diet  Status: observation  Payor source: [facility]  Discharge disposition: Home with sign other  Potential Barriers: pregnant  ADOD: 1/7/2024  YONATAN Powers RN, BSN Transitional Care Coordinator ED# [phone]

## 2024-01-08 ENCOUNTER — APPOINTMENT (OUTPATIENT)
Dept: RADIOLOGY | Facility: HOSPITAL | Age: 24
End: 2024-01-08
Payer: COMMERCIAL

## 2024-01-08 VITALS
TEMPERATURE: 97.8 F | WEIGHT: 121.03 LBS | SYSTOLIC BLOOD PRESSURE: 116 MMHG | RESPIRATION RATE: 20 BRPM | HEART RATE: 91 BPM | DIASTOLIC BLOOD PRESSURE: 61 MMHG | BODY MASS INDEX: 23.76 KG/M2 | HEIGHT: 60 IN | OXYGEN SATURATION: 100 %

## 2024-01-08 PROCEDURE — 2500000001 HC RX 250 WO HCPCS SELF ADMINISTERED DRUGS (ALT 637 FOR MEDICARE OP)

## 2024-01-08 PROCEDURE — 71275 CT ANGIOGRAPHY CHEST: CPT

## 2024-01-08 PROCEDURE — 2550000001 HC RX 255 CONTRASTS: Performed by: INTERNAL MEDICINE

## 2024-01-08 PROCEDURE — 71275 CT ANGIOGRAPHY CHEST: CPT | Performed by: RADIOLOGY

## 2024-01-08 PROCEDURE — 2500000001 HC RX 250 WO HCPCS SELF ADMINISTERED DRUGS (ALT 637 FOR MEDICARE OP): Performed by: NURSE PRACTITIONER

## 2024-01-08 PROCEDURE — G0378 HOSPITAL OBSERVATION PER HR: HCPCS

## 2024-01-08 PROCEDURE — 2500000004 HC RX 250 GENERAL PHARMACY W/ HCPCS (ALT 636 FOR OP/ED): Performed by: NURSE PRACTITIONER

## 2024-01-08 PROCEDURE — 99233 SBSQ HOSP IP/OBS HIGH 50: CPT | Performed by: NURSE PRACTITIONER

## 2024-01-08 RX ORDER — CEPHALEXIN 500 MG/1
500 CAPSULE ORAL EVERY 6 HOURS SCHEDULED
Qty: 20 CAPSULE | Refills: 0 | Status: SHIPPED | OUTPATIENT
Start: 2024-01-08 | End: 2024-01-13

## 2024-01-08 RX ORDER — CEPHALEXIN 500 MG/1
500 CAPSULE ORAL EVERY 6 HOURS SCHEDULED
Status: DISCONTINUED | OUTPATIENT
Start: 2024-01-08 | End: 2024-01-08 | Stop reason: HOSPADM

## 2024-01-08 RX ORDER — ONDANSETRON 4 MG/1
4 TABLET, FILM COATED ORAL EVERY 8 HOURS PRN
Qty: 20 TABLET | Refills: 0 | Status: SHIPPED | OUTPATIENT
Start: 2024-01-08

## 2024-01-08 RX ADMIN — IOHEXOL 75 ML: 350 INJECTION, SOLUTION INTRAVENOUS at 15:30

## 2024-01-08 RX ADMIN — CEPHALEXIN 500 MG: 500 CAPSULE ORAL at 13:49

## 2024-01-08 RX ADMIN — CEPHALEXIN 500 MG: 500 CAPSULE ORAL at 08:47

## 2024-01-08 RX ADMIN — SODIUM CHLORIDE, POTASSIUM CHLORIDE, SODIUM LACTATE AND CALCIUM CHLORIDE 100 ML/HR: 600; 310; 30; 20 INJECTION, SOLUTION INTRAVENOUS at 03:23

## 2024-01-08 RX ADMIN — FOLIC ACID 1 MG: 1 TABLET ORAL at 08:47

## 2024-01-08 SDOH — ECONOMIC STABILITY: FOOD INSECURITY: WITHIN THE PAST 12 MONTHS, YOU WORRIED THAT YOUR FOOD WOULD RUN OUT BEFORE YOU GOT MONEY TO BUY MORE.: NEVER TRUE

## 2024-01-08 SDOH — ECONOMIC STABILITY: FOOD INSECURITY: WITHIN THE PAST 12 MONTHS, THE FOOD YOU BOUGHT JUST DIDN'T LAST AND YOU DIDN'T HAVE MONEY TO GET MORE.: NEVER TRUE

## 2024-01-08 SDOH — ECONOMIC STABILITY: TRANSPORTATION INSECURITY
IN THE PAST 12 MONTHS, HAS LACK OF TRANSPORTATION KEPT YOU FROM MEETINGS, WORK, OR FROM GETTING THINGS NEEDED FOR DAILY LIVING?: NO

## 2024-01-08 SDOH — ECONOMIC STABILITY: TRANSPORTATION INSECURITY
IN THE PAST 12 MONTHS, HAS THE LACK OF TRANSPORTATION KEPT YOU FROM MEDICAL APPOINTMENTS OR FROM GETTING MEDICATIONS?: NO

## 2024-01-08 SDOH — ECONOMIC STABILITY: TRANSPORTATION INSECURITY

## 2024-01-08 SDOH — ECONOMIC STABILITY: FOOD INSECURITY: WITHIN THE PAST 12 MONTHS, THE FOOD YOU BOUGHT JUST DIDN’T LAST AND YOU DIDN’T HAVE MONEY TO GET MORE.: NEVER TRUE

## 2024-01-08 SDOH — ECONOMIC STABILITY: FOOD INSECURITY: WITHIN THE PAST 12 MONTHS, YOU WORRIED THAT YOUR FOOD WOULD RUN OUT BEFORE YOU GOT THE MONEY TO BUY MORE.: NEVER TRUE

## 2024-01-08 SDOH — ECONOMIC STABILITY: TRANSPORTATION INSECURITY: IN THE PAST 12 MONTHS, HAS LACK OF TRANSPORTATION KEPT YOU FROM MEDICAL APPOINTMENTS OR FROM GETTING MEDICATIONS?: NO

## 2024-01-08 SDOH — ECONOMIC STABILITY: FOOD INSECURITY

## 2024-01-08 ASSESSMENT — COGNITIVE AND FUNCTIONAL STATUS - GENERAL
DAILY ACTIVITIY SCORE: 24
MOBILITY SCORE: 24

## 2024-01-08 ASSESSMENT — PAIN SCALES - GENERAL
PAINLEVEL_OUTOF10: 0 - NO PAIN
PAINLEVEL_OUTOF10: 0 - NO PAIN

## 2024-01-08 ASSESSMENT — ACTIVITIES OF DAILY LIVING (ADL)
LACK_OF_TRANSPORTATION: NO
LACK_OF_TRANSPORTATION: NO

## 2024-01-08 ASSESSMENT — PAIN - FUNCTIONAL ASSESSMENT: PAIN_FUNCTIONAL_ASSESSMENT: 0-10

## 2024-01-08 ASSESSMENT — PAIN SCALES - PAIN ASSESSMENT IN ADVANCED DEMENTIA (PAINAD): BREATHING: NORMAL

## 2024-01-08 NOTE — PROGRESS NOTES
India Naidu is a 23 y.o. female on day 0 of admission presenting with Hyperemesis gravidarum.    Subjective   Awake in bed.  Reports she is starting to feel better and was able to tolerate breakfast.  Endorses the chest pain, and shortness of breath.  She is still noted to have heart rate greater than 100 on telemetry.  Endorses she was able to schedule an appointment with her regular GYN for this week.       Objective     Physical Exam  Constitutional:       Appearance: Normal appearance.   Cardiovascular:      Rate and Rhythm: Normal rate and regular rhythm.      Pulses: Normal pulses.      Heart sounds: Normal heart sounds. No murmur heard.     No gallop.   Pulmonary:      Effort: Pulmonary effort is normal. No respiratory distress.      Breath sounds: Normal breath sounds. No wheezing or rhonchi.   Abdominal:      General: Abdomen is flat. There is no distension.      Palpations: Abdomen is soft.      Tenderness: There is no abdominal tenderness. There is no guarding.   Musculoskeletal:         General: Normal range of motion.   Skin:     General: Skin is warm.      Capillary Refill: Capillary refill takes less than 2 seconds.   Neurological:      Mental Status: She is alert and oriented to person, place, and time.   Psychiatric:         Mood and Affect: Mood normal.         Thought Content: Thought content normal.         Judgment: Judgment normal.         Last Recorded Vitals  Blood pressure 127/74, pulse 100, temperature 36.6 °C (97.9 °F), temperature source Temporal, resp. rate 18, height 1.524 m (5'), weight 54.9 kg (121 lb 0.5 oz), SpO2 100 %.  Intake/Output last 3 Shifts:  I/O last 3 completed shifts:  In: 5191.7 (94.6 mL/kg) [I.V.:5091.7 (92.7 mL/kg); IV Piggyback:100]  Out: - (0 mL/kg)   Weight: 54.9 kg     Relevant Results  Scheduled medications  cephalexin, 500 mg, oral, q6h CARLY  folic acid, 1 mg, oral, Daily      Continuous medications  lactated Ringer's, 100 mL/hr, Last Rate: 100 mL/hr (01/08/24  "8364)      PRN medications  PRN medications: acetaminophen, metoclopramide **OR** metoclopramide    No results found for this or any previous visit (from the past 24 hour(s)).    ECG 12 lead    Result Date: 2024  Sinus tachycardia Nonspecific T wave abnormality Abnormal ECG When compared with ECG of 19-DEC-2016 22:55, PREVIOUS ECG IS PRESENT    XR chest 1 view    Result Date: 2024  STUDY: Chest Radiograph;  2024, 2:06PM INDICATION: Chest pain and shortness of breath. COMPARISON: None Available ACCESSION NUMBER(S): [id number] ORDERING CLINICIAN: GABO GARIBAY TECHNIQUE:  Frontal chest was obtained at 14:05 hours. FINDINGS: CARDIOMEDIASTINAL SILHOUETTE: Cardiomediastinal silhouette is normal in size and configuration.  LUNGS: Lungs are clear.  ABDOMEN: No remarkable upper abdominal findings.  BONES: No acute osseous changes.    No radiographic evidence of acute cardiopulmonary disease. Signed by Mark Olsen MD                          Assessment/Plan   Principal Problem:    Hyperemesis gravidarum  India Naidu is a 23 y.o. female presenting with persistent nausea/ vomiting and poor PO intake for 2 weeks.  Patient reports being approximately 8 weeks pregnant. -0-1-0 with LMP in early November . She found out that she was pregnant at the beginning of December, since finding out about the pregnancy she began having \"normal\" pregnancy symptoms, similar to her previous pregnancy, nausea and fatigue however, in the past 2 weeks she has had profound N/V and unable to keep anything down.  Also reports having shortness of breath, profound fatigue, chest pain and dizziness requiring her to sit down after being up for only 3 minutes or so. She reports these symptoms being much more intense than her first pregnancy. She notes that she had preeclampsia with her first pregnancy ( her daughter is now 1 years old).   No headache, vision changes, hemoptysis, unilateral leg swelling. Has not had vaginal bleeding. " States that she had a miscarriage early 2023 and had preeclampsia with her prior pregnancy but has a healthy daughter.  She has yet to see an OB with this pregnancy, her first appointment is scheduled for 1/8/2024.     ED work up: K: 2.7 Mag 1.5 CXR: No radiographic evidence of acute cardiopulmonary disease. Urinalysis: Hazy in appearance, protein 100 (2+), glucose 50 (1+), ketones 80 (2+), urobilinogen 4.0.  FLU A/B and Covid swabs were negative. Troponin <3, and d-dimer 223.     Hyperemersis gravidarum   - s/p 2L Lactated ringers   - Continue NS @250ml HR  - Clear liquid diet as tolerated, advance diet as able  - Antiemetics PRN     Chest pain/ SOB  - likely secondary to fluid imbalance  - troponin <3  - ddimer 223  - ECG: Sinus tachycardia   - Continue IVF, monitor on telemetry   -rule out PE-> await CT PE.  Did discuss with GYN on-call Dr. Sanchez, CT PE preferred, however can get VQ scan.  Discussed with patient, agreeable CT PE     Electrolyte imbalance  - Hypokalemia: 2.7>3.7  - Magnesium: 1.5>1.60  - Monitor and replace as needed  - Daily CMP, daily magnesium   - Telemetry to monitor for arrhythmias      UTI   - Urinalysis: Hazy in appearance, protein 100 (2+), glucose 50 (1+), ketones 80 (2+), urobilinogen 4.0.   - Urine culture in progress  - s/p cephalexin in ED  - Continue abx, follow culture     Discharge disposition  - Home when medically stable         Labs/Testing reviewed     Interdisciplinary team rounding completed with hospitalist, nurse, TCC     NP discussed plan and lab/testing results with Dr. Barrientos    Will send home on ABX for leukocytosis and urine she will follow-up with her OB/GYN  If CT PE negative, will discharge    I spent 50 minutes in the professional and overall care of this patient.      Rupal James, APRN-CNP

## 2024-01-08 NOTE — DISCHARGE INSTRUCTIONS
Please follow with your PCP upon discharge as hospital follow up.  Please also follow-up with your GYN, you have appointment scheduled.

## 2024-01-08 NOTE — CARE PLAN
The patient's goals for the shift include      The clinical goals for the shift include safety      Problem: Pain  Goal: My pain/discomfort is manageable  Outcome: Progressing     Problem: Safety  Goal: Patient will be injury free during hospitalization  Outcome: Progressing  Goal: I will remain free of falls  Outcome: Progressing     Problem: Daily Care  Goal: Daily care needs are met  Outcome: Progressing     Problem: Psychosocial Needs  Goal: Demonstrates ability to cope with hospitalization/illness  Outcome: Progressing  Goal: Collaborate with me, my family, and caregiver to identify my specific goals  Outcome: Progressing     Problem: Discharge Barriers  Goal: My discharge needs are met  Outcome: Progressing

## 2024-01-08 NOTE — PROGRESS NOTES
Discussed patient during IDR-patient will have CT to rule out PE.  If CT results are negative-patient will be discharged.  Will continue to follow for discharge planning needs.

## 2024-01-08 NOTE — DISCHARGE SUMMARY
India Naidu is a 23 y.o. female on day 0 of admission presenting with Hyperemesis gravidarum.    Subjective   Awake in bed.  Reports she is starting to feel better and was able to tolerate breakfast.  Endorses the chest pain, and shortness of breath.  She is still noted to have heart rate greater than 100 on telemetry.  Endorses she was able to schedule an appointment with her regular GYN for this week.        Your medication list        START taking these medications        Instructions Last Dose Given Next Dose Due   cephalexin 500 mg capsule  Commonly known as: Keflex      Take 1 capsule (500 mg) by mouth every 6 hours for 5 days.       ondansetron 4 mg tablet  Commonly known as: Zofran      Take 1 tablet (4 mg) by mouth every 8 hours if needed for nausea or vomiting.                 Where to Get Your Medications        These medications were sent to [id number] - [place], [place] - [address]  [address], [facility] [address]      Phone: [phone]   cephalexin 500 mg capsule  ondansetron 4 mg tablet         Objective     Physical Exam  Constitutional:       Appearance: Normal appearance.   Cardiovascular:      Rate and Rhythm: Normal rate and regular rhythm.      Pulses: Normal pulses.      Heart sounds: Normal heart sounds. No murmur heard.     No gallop.   Pulmonary:      Effort: Pulmonary effort is normal. No respiratory distress.      Breath sounds: Normal breath sounds. No wheezing or rhonchi.   Abdominal:      General: Abdomen is flat. There is no distension.      Palpations: Abdomen is soft.      Tenderness: There is no abdominal tenderness. There is no guarding.   Musculoskeletal:         General: Normal range of motion.   Skin:     General: Skin is warm.      Capillary Refill: Capillary refill takes less than 2 seconds.   Neurological:      Mental Status: She is alert and oriented to person, place, and time.   Psychiatric:         Mood and Affect: Mood  normal.         Thought Content: Thought content normal.         Judgment: Judgment normal.         Last Recorded Vitals  Blood pressure 116/61, pulse 91, temperature 36.6 °C (97.8 °F), temperature source Temporal, resp. rate 20, height 1.524 m (5'), weight 54.9 kg (121 lb 0.5 oz), SpO2 100 %.  Intake/Output last 3 Shifts:  I/O last 3 completed shifts:  In: 5191.7 (94.6 mL/kg) [I.V.:5091.7 (92.7 mL/kg); IV Piggyback:100]  Out: - (0 mL/kg)   Weight: 54.9 kg     Relevant Results  Scheduled medications  cephalexin, 500 mg, oral, q6h CARLY  folic acid, 1 mg, oral, Daily      Continuous medications  lactated Ringer's, 100 mL/hr, Last Rate: 100 mL/hr (24 1544)      PRN medications  PRN medications: acetaminophen, metoclopramide **OR** metoclopramide    No results found for this or any previous visit (from the past 24 hour(s)).    ECG 12 lead    Result Date: 2024  Sinus tachycardia Nonspecific T wave abnormality Abnormal ECG When compared with ECG of 19-DEC-2016 22:55, PREVIOUS ECG IS PRESENT    XR chest 1 view    Result Date: 2024  STUDY: Chest Radiograph;  2024, 2:06PM INDICATION: Chest pain and shortness of breath. COMPARISON: None Available ACCESSION NUMBER(S): [id number] ORDERING CLINICIAN: GABO GARIBAY TECHNIQUE:  Frontal chest was obtained at 14:05 hours. FINDINGS: CARDIOMEDIASTINAL SILHOUETTE: Cardiomediastinal silhouette is normal in size and configuration.  LUNGS: Lungs are clear.  ABDOMEN: No remarkable upper abdominal findings.  BONES: No acute osseous changes.    No radiographic evidence of acute cardiopulmonary disease. Signed by Mark Olsen MD                          Assessment/Plan   Principal Problem:    Hyperemesis gravidarum  India Naidu is a 23 y.o. female presenting with persistent nausea/ vomiting and poor PO intake for 2 weeks.  Patient reports being approximately 8 weeks pregnant. -0-1-0 with LMP in early November . She found out that she was pregnant at the beginning  "of December, since finding out about the pregnancy she began having \"normal\" pregnancy symptoms, similar to her previous pregnancy, nausea and fatigue however, in the past 2 weeks she has had profound N/V and unable to keep anything down.  Also reports having shortness of breath, profound fatigue, chest pain and dizziness requiring her to sit down after being up for only 3 minutes or so. She reports these symptoms being much more intense than her first pregnancy. She notes that she had preeclampsia with her first pregnancy ( her daughter is now 1 years old).   No headache, vision changes, hemoptysis, unilateral leg swelling. Has not had vaginal bleeding. States that she had a miscarriage early 2023 and had preeclampsia with her prior pregnancy but has a healthy daughter.  She has yet to see an OB with this pregnancy, her first appointment is scheduled for 1/8/2024.     ED work up: K: 2.7 Mag 1.5 CXR: No radiographic evidence of acute cardiopulmonary disease. Urinalysis: Hazy in appearance, protein 100 (2+), glucose 50 (1+), ketones 80 (2+), urobilinogen 4.0.  FLU A/B and Covid swabs were negative. Troponin <3, and d-dimer 223.     Hyperemersis gravidarum   - s/p 2L Lactated ringers   - Continue NS @250ml HR  - Clear liquid diet as tolerated, advance diet as able  - Antiemetics PRN     Chest pain/ SOB  - likely secondary to fluid imbalance  - troponin <3  - ddimer 223  - ECG: Sinus tachycardia   - Continue IVF, monitor on telemetry   -yesterday she was still dizzy and tachycardic with CP and SOB, so CT was recommended   -rule out PE-> await CT PE.  Did discuss with GYN on-call Dr. Sanchez, CT PE preferred, however can get VQ scan.  Discussed with patient, agreeable CT PE     Electrolyte imbalance  - Hypokalemia: 2.7>3.7  - Magnesium: 1.5>1.60  - Monitor and replace as needed  - Daily CMP, daily magnesium   - Telemetry to monitor for arrhythmias      UTI   - Urinalysis: Hazy in appearance, protein 100 (2+), " glucose 50 (1+), ketones 80 (2+), urobilinogen 4.0.   - Urine culture in progress  - s/p cephalexin in ED  - Continue abx, follow culture     Discharge disposition  - Home when medically stable         Labs/Testing reviewed     Interdisciplinary team rounding completed with hospitalist, nurse, TCC     NP discussed plan and lab/testing results with Dr. Barrientos    Will send home on ABX for leukocytosis and urine she will follow-up with her OB/GYN  If CT PE negative, will discharge    I spent 50 minutes in the professional and overall care of this patient.      Rupal James, APRN-CNP

## 2024-01-10 LAB
ATRIAL RATE: 102 BPM
P AXIS: 56 DEGREES
P OFFSET: 190 MS
P ONSET: 147 MS
PR INTERVAL: 140 MS
Q ONSET: 217 MS
QRS COUNT: 17 BEATS
QRS DURATION: 98 MS
QT INTERVAL: 332 MS
QTC CALCULATION(BAZETT): 432 MS
QTC FREDERICIA: 396 MS
R AXIS: 71 DEGREES
T AXIS: 48 DEGREES
T OFFSET: 383 MS
VENTRICULAR RATE: 102 BPM

## 2024-04-03 ENCOUNTER — APPOINTMENT (OUTPATIENT)
Dept: OBSTETRICS AND GYNECOLOGY | Facility: CLINIC | Age: 24
End: 2024-04-03
Payer: COMMERCIAL

## 2024-04-03 ENCOUNTER — OFFICE VISIT (OUTPATIENT)
Dept: OBSTETRICS AND GYNECOLOGY | Facility: CLINIC | Age: 24
End: 2024-04-03
Payer: COMMERCIAL

## 2024-04-03 VITALS
SYSTOLIC BLOOD PRESSURE: 117 MMHG | HEART RATE: 83 BPM | WEIGHT: 118.2 LBS | BODY MASS INDEX: 23.08 KG/M2 | DIASTOLIC BLOOD PRESSURE: 70 MMHG

## 2024-04-03 DIAGNOSIS — Z30.013 ENCOUNTER FOR INITIAL PRESCRIPTION OF INJECTABLE CONTRACEPTIVE: Primary | ICD-10-CM

## 2024-04-03 PROCEDURE — 1036F TOBACCO NON-USER: CPT | Performed by: STUDENT IN AN ORGANIZED HEALTH CARE EDUCATION/TRAINING PROGRAM

## 2024-04-03 PROCEDURE — 99214 OFFICE O/P EST MOD 30 MIN: CPT | Performed by: STUDENT IN AN ORGANIZED HEALTH CARE EDUCATION/TRAINING PROGRAM

## 2024-04-03 PROCEDURE — 96372 THER/PROPH/DIAG INJ SC/IM: CPT | Performed by: STUDENT IN AN ORGANIZED HEALTH CARE EDUCATION/TRAINING PROGRAM

## 2024-04-03 PROCEDURE — 2500000004 HC RX 250 GENERAL PHARMACY W/ HCPCS (ALT 636 FOR OP/ED): Mod: SE | Performed by: STUDENT IN AN ORGANIZED HEALTH CARE EDUCATION/TRAINING PROGRAM

## 2024-04-03 RX ORDER — MEDROXYPROGESTERONE ACETATE 150 MG/ML
150 INJECTION, SUSPENSION INTRAMUSCULAR
Status: SHIPPED | OUTPATIENT
Start: 2024-04-03 | End: 2025-03-29

## 2024-04-03 RX ADMIN — MEDROXYPROGESTERONE ACETATE 150 MG: 150 INJECTION, SUSPENSION INTRAMUSCULAR at 09:36

## 2024-04-03 NOTE — PROGRESS NOTES
Subjective   Patient ID: India Naidu is a 23 y.o. female who presents for contraception counseling.     Desires depo. Has used before and was satisfied.     Reviewed pregnancy history and updated records. Pap UTD from [facility].    Objective   Physical Exam  Constitutional:       General: She is not in acute distress.     Appearance: Normal appearance.   HENT:      Head: Normocephalic.   Cardiovascular:      Rate and Rhythm: Normal rate.   Pulmonary:      Effort: Pulmonary effort is normal. No respiratory distress.   Skin:     General: Skin is warm and dry.   Neurological:      Mental Status: She is alert and oriented to person, place, and time.   Psychiatric:         Mood and Affect: Mood normal.         Behavior: Behavior normal.         Thought Content: Thought content normal.         Judgment: Judgment normal.         Assessment/Plan   Problem List Items Addressed This Visit             ICD-10-CM    Encounter for initial prescription of injectable contraceptive - Primary Z30.013     - Discussion held today regarding reproductive life planning and family planning, as well as optimization of health for future pregnancies. Autonomy of patient respected and confidentiality discussed. All currently available methods of contraception discussed, including temporary and permanent methods. Indications, risks, benefits, bleeding patterns, expectations, usage instructions, and efficacy of each reviewed. Informed patient that no hormonal methods of contraception prevent acquisition or transmission of STDs, and that condoms should be used for that purpose if it is relevant to her exposure risk. CDC guidelines for STD testing should be followed for routine testing, in addition to patient driven testing based on concerns, symptoms, and exposures. Ease of testing reviewed, and encouraged self-awareness of patient's own risks.   - Based on her current history, and based on CDC [facility] guidelines, she is a candidate for all  available methods except:  none  - Patient chooses to use:  Depo Provera  - Reviewed instructions for initiation and continuation per current CDC SPR guidelines. UPT neg.  - Need for backup contraception reviewed based on current CDC [facility] and SPR guidelines. Questions answered. Call parameters reviewed.          Relevant Medications    medroxyPROGESTERone (Depo-Provera) injection 150 mg (Start on 4/3/2024  9:45 AM)            Javid Lambert MD 04/03/24 9:31 AM

## 2024-04-03 NOTE — ASSESSMENT & PLAN NOTE
- Discussion held today regarding reproductive life planning and family planning, as well as optimization of health for future pregnancies. Autonomy of patient respected and confidentiality discussed. All currently available methods of contraception discussed, including temporary and permanent methods. Indications, risks, benefits, bleeding patterns, expectations, usage instructions, and efficacy of each reviewed. Informed patient that no hormonal methods of contraception prevent acquisition or transmission of STDs, and that condoms should be used for that purpose if it is relevant to her exposure risk. CDC guidelines for STD testing should be followed for routine testing, in addition to patient driven testing based on concerns, symptoms, and exposures. Ease of testing reviewed, and encouraged self-awareness of patient's own risks.   - Based on her current history, and based on CDC [facility] guidelines, she is a candidate for all available methods except:  none  - Patient chooses to use:  Depo Provera  - Reviewed instructions for initiation and continuation per current [facility] SPR guidelines. UPT neg.  - Need for backup contraception reviewed based on current [facility] and SPR guidelines. Questions answered. Call parameters reviewed.

## 2024-05-03 ENCOUNTER — OFFICE VISIT (OUTPATIENT)
Dept: PRIMARY CARE | Facility: CLINIC | Age: 24
End: 2024-05-03
Payer: COMMERCIAL

## 2024-05-03 VITALS
SYSTOLIC BLOOD PRESSURE: 116 MMHG | RESPIRATION RATE: 15 BRPM | WEIGHT: 129.2 LBS | HEIGHT: 60 IN | BODY MASS INDEX: 25.36 KG/M2 | DIASTOLIC BLOOD PRESSURE: 62 MMHG | TEMPERATURE: 97 F | HEART RATE: 78 BPM | OXYGEN SATURATION: 98 %

## 2024-05-03 DIAGNOSIS — E66.3 OVERWEIGHT (BMI 25.0-29.9): ICD-10-CM

## 2024-05-03 DIAGNOSIS — R63.5 WEIGHT GAIN: ICD-10-CM

## 2024-05-03 DIAGNOSIS — Z30.42 ENCOUNTER FOR SURVEILLANCE OF INJECTABLE CONTRACEPTIVE: ICD-10-CM

## 2024-05-03 DIAGNOSIS — Z23 NEED FOR HEPATITIS B VACCINATION: Primary | ICD-10-CM

## 2024-05-03 PROBLEM — O16.3 ELEVATED BLOOD PRESSURE COMPLICATING PREGNANCY IN THIRD TRIMESTER, ANTEPARTUM (HHS-HCC): Status: ACTIVE | Noted: 2024-05-03

## 2024-05-03 PROBLEM — U07.1 SEVERE ACUTE RESPIRATORY SYNDROME CORONAVIRUS 2 (SARS-COV-2) DETECTED: Status: ACTIVE | Noted: 2024-05-03

## 2024-05-03 PROBLEM — Z59.41 FOOD INSECURITY: Status: ACTIVE | Noted: 2024-05-03

## 2024-05-03 PROBLEM — N89.8 VAGINAL IRRITATION: Status: ACTIVE | Noted: 2024-05-03

## 2024-05-03 PROBLEM — R42 DIZZINESS AND GIDDINESS: Status: ACTIVE | Noted: 2023-09-25

## 2024-05-03 PROBLEM — D64.9 NORMOCYTIC ANEMIA: Status: ACTIVE | Noted: 2024-05-03

## 2024-05-03 PROBLEM — H53.041 AMBLYOPIA SUSPECT, RIGHT EYE: Status: ACTIVE | Noted: 2023-09-25

## 2024-05-03 PROBLEM — R39.89 SUSPECTED URINARY TRACT INFECTION: Status: ACTIVE | Noted: 2024-05-03

## 2024-05-03 PROBLEM — B37.31 CANDIDIASIS OF VAGINA: Status: ACTIVE | Noted: 2024-05-03

## 2024-05-03 PROBLEM — L70.9 ACNE: Status: ACTIVE | Noted: 2024-05-03

## 2024-05-03 PROBLEM — H18.20 CORNEAL EDEMA OF RIGHT EYE: Status: ACTIVE | Noted: 2024-05-03

## 2024-05-03 PROBLEM — N30.00 ACUTE CYSTITIS: Status: ACTIVE | Noted: 2024-05-03

## 2024-05-03 PROBLEM — O02.1 MISSED AB (HHS-HCC): Status: ACTIVE | Noted: 2023-06-14

## 2024-05-03 PROBLEM — K08.89 TOOTHACHE: Status: ACTIVE | Noted: 2022-08-07

## 2024-05-03 PROBLEM — J11.1 INFLUENZA: Status: ACTIVE | Noted: 2024-05-03

## 2024-05-03 PROBLEM — H52.223 MYOPIA OF BOTH EYES WITH REGULAR ASTIGMATISM: Status: ACTIVE | Noted: 2024-05-03

## 2024-05-03 PROBLEM — N94.6 DYSMENORRHEA IN THE ADOLESCENT: Status: ACTIVE | Noted: 2024-05-03

## 2024-05-03 PROBLEM — O14.90 PRE-ECLAMPSIA (HHS-HCC): Status: ACTIVE | Noted: 2024-05-03

## 2024-05-03 PROBLEM — H21.509: Status: ACTIVE | Noted: 2024-05-03

## 2024-05-03 PROBLEM — Q13.9: Status: ACTIVE | Noted: 2024-05-03

## 2024-05-03 PROBLEM — H52.13 MYOPIA OF BOTH EYES WITH REGULAR ASTIGMATISM: Status: ACTIVE | Noted: 2024-05-03

## 2024-05-03 PROBLEM — O02.1 IUFD AT LESS THAN 20 WEEKS OF GESTATION (HHS-HCC): Status: ACTIVE | Noted: 2023-06-12

## 2024-05-03 PROBLEM — O92.70 UNSPECIFIED DISORDERS OF LACTATION (HHS-HCC): Status: ACTIVE | Noted: 2024-05-03

## 2024-05-03 PROBLEM — R05.9 COUGH: Status: ACTIVE | Noted: 2024-05-03

## 2024-05-03 PROCEDURE — 3074F SYST BP LT 130 MM HG: CPT | Performed by: NURSE PRACTITIONER

## 2024-05-03 PROCEDURE — 3078F DIAST BP <80 MM HG: CPT | Performed by: NURSE PRACTITIONER

## 2024-05-03 PROCEDURE — 1036F TOBACCO NON-USER: CPT | Performed by: NURSE PRACTITIONER

## 2024-05-03 PROCEDURE — 99213 OFFICE O/P EST LOW 20 MIN: CPT | Performed by: NURSE PRACTITIONER

## 2024-05-03 PROCEDURE — 90471 IMMUNIZATION ADMIN: CPT | Performed by: NURSE PRACTITIONER

## 2024-05-03 PROCEDURE — 90739 HEPB VACC 2/4 DOSE ADULT IM: CPT | Performed by: NURSE PRACTITIONER

## 2024-05-03 RX ORDER — IBUPROFEN 600 MG/1
600 TABLET ORAL EVERY 8 HOURS PRN
COMMUNITY

## 2024-05-03 ASSESSMENT — PATIENT HEALTH QUESTIONNAIRE - PHQ9
1. LITTLE INTEREST OR PLEASURE IN DOING THINGS: NOT AT ALL
2. FEELING DOWN, DEPRESSED OR HOPELESS: NOT AT ALL
SUM OF ALL RESPONSES TO PHQ9 QUESTIONS 1 AND 2: 0

## 2024-05-03 NOTE — PROGRESS NOTES
Subjective   Patient ID: India Naidu is a 24 y.o. female who presents for Follow-up (Pt is here for hep b vaccine for work.).    HPI   The patient works at [employer]  in [place]   She starts to work on Monday and needed a Hep b injection prior to her starting work . She needs a letter that she got the hep b injection .   She also needs proof that she has negative testing for tb   She had tb testing less than 6 months ago and can show her job that paper   She is not having any issues at this time   She is on depo and she noticed that she has gained weight   She is eating more she has more hunger since she started it   She wants to change her birth control       Review of Systems Negative except what was mentioned in the HPI       Objective   /62 (Patient Position: Sitting)   Pulse 78   Temp 36.1 °C (97 °F)   Resp 15   Ht 1.524 m (5')   Wt 58.6 kg (129 lb 3.2 oz)   SpO2 98%   BMI 25.23 kg/m²     Physical Exam  Vitals and nursing note reviewed.   Constitutional:       Appearance: Normal appearance.   HENT:      Head: Normocephalic and atraumatic.      Right Ear: Tympanic membrane normal.      Left Ear: Tympanic membrane normal.      Nose: Nose normal.      Mouth/Throat:      Mouth: Mucous membranes are moist.   Eyes:      Extraocular Movements: Extraocular movements intact.      Conjunctiva/sclera: Conjunctivae normal.   Cardiovascular:      Rate and Rhythm: Normal rate and regular rhythm.      Pulses: Normal pulses.      Heart sounds: Normal heart sounds.   Pulmonary:      Effort: Pulmonary effort is normal.      Breath sounds: Normal breath sounds.   Abdominal:      General: Abdomen is flat. Bowel sounds are normal.      Palpations: Abdomen is soft.   Musculoskeletal:         General: Normal range of motion.      Cervical back: Normal range of motion and neck supple.   Skin:     General: Skin is warm and dry.   Neurological:      General: No focal deficit present.      Mental Status: She is alert  and oriented to person, place, and time. Mental status is at baseline.   Psychiatric:         Mood and Affect: Mood normal.         Behavior: Behavior normal.         Thought Content: Thought content normal.         Judgment: Judgment normal.         Assessment/Plan   Problem List Items Addressed This Visit    None  Visit Diagnoses         Codes    Need for hepatitis B vaccination    -  Primary Z23    Encounter for surveillance of injectable contraceptive     Z30.42    Weight gain     R63.5    Overweight (BMI 25.0-29.9)     E66.3

## 2024-05-03 NOTE — PROGRESS NOTES
Subjective   Patient ID: India Naidu is a 24 y.o. female who presents for Follow-up (Pt is here for hep b vaccine for work.).    HPI   The patient works at [employer]  in [place]   She starts to work on Monday and needed a Hep b injection prior to her starting work . She needs a letter that she got the hep b injection .   She also needs proof that she has negative testing for tb   She had tb testing less than 6 months ago and can show her job that paper   She is not having any issues at this time   She is on depo and she noticed that she has gained weight   She is eating more she has more hunger since she started it   She wants to change her birth control       Review of Systems Negative except what was mentioned in the HPI       Objective   /62 (Patient Position: Sitting)   Pulse 78   Temp 36.1 °C (97 °F)   Resp 15   Ht 1.524 m (5')   Wt 58.6 kg (129 lb 3.2 oz)   SpO2 98%   BMI 25.23 kg/m²     Physical Exam  Vitals and nursing note reviewed.   Constitutional:       Appearance: Normal appearance.   HENT:      Head: Normocephalic and atraumatic.      Right Ear: Tympanic membrane normal.      Left Ear: Tympanic membrane normal.      Nose: Nose normal.      Mouth/Throat:      Mouth: Mucous membranes are moist.   Eyes:      Extraocular Movements: Extraocular movements intact.      Conjunctiva/sclera: Conjunctivae normal.   Cardiovascular:      Rate and Rhythm: Normal rate and regular rhythm.      Pulses: Normal pulses.      Heart sounds: Normal heart sounds.   Pulmonary:      Effort: Pulmonary effort is normal.      Breath sounds: Normal breath sounds.   Abdominal:      General: Abdomen is flat. Bowel sounds are normal.      Palpations: Abdomen is soft.   Musculoskeletal:         General: Normal range of motion.      Cervical back: Normal range of motion and neck supple.   Skin:     General: Skin is warm and dry.   Neurological:      General: No focal deficit present.      Mental Status: She is alert  and oriented to person, place, and time. Mental status is at baseline.   Psychiatric:         Mood and Affect: Mood normal.         Behavior: Behavior normal.         Thought Content: Thought content normal.         Judgment: Judgment normal.         Assessment/Plan

## 2024-05-03 NOTE — LETTER
May 3, 2024     Patient: India Naidu   YOB: 2000   Date of Visit: 5/3/2024       To Whom It May Concern:    India Naidu was seen in my clinic on 5/3/2024 at 2:10 pm.  She received a heptavax vaccine today .    Sincerely,      KERI Quick-CNP        CC: No Recipients

## 2024-05-03 NOTE — PATIENT INSTRUCTIONS
Follow up with gyn if the depo is not working for you   You might want to consider an iud which is something   There is other methods of birth

## 2024-06-10 ENCOUNTER — APPOINTMENT (OUTPATIENT)
Dept: OBSTETRICS AND GYNECOLOGY | Facility: CLINIC | Age: 24
End: 2024-06-10
Payer: COMMERCIAL

## 2024-06-25 ENCOUNTER — APPOINTMENT (OUTPATIENT)
Dept: OBSTETRICS AND GYNECOLOGY | Facility: CLINIC | Age: 24
End: 2024-06-25
Payer: COMMERCIAL

## 2024-08-12 ENCOUNTER — HOSPITAL ENCOUNTER (EMERGENCY)
Facility: HOSPITAL | Age: 24
Discharge: HOME | End: 2024-08-13
Attending: EMERGENCY MEDICINE
Payer: COMMERCIAL

## 2024-08-12 VITALS
HEIGHT: 60 IN | HEART RATE: 73 BPM | WEIGHT: 131 LBS | BODY MASS INDEX: 25.72 KG/M2 | SYSTOLIC BLOOD PRESSURE: 138 MMHG | DIASTOLIC BLOOD PRESSURE: 93 MMHG | OXYGEN SATURATION: 99 % | TEMPERATURE: 97 F | RESPIRATION RATE: 18 BRPM

## 2024-08-12 DIAGNOSIS — K08.89 PAIN, DENTAL: Primary | ICD-10-CM

## 2024-08-12 PROCEDURE — 99284 EMERGENCY DEPT VISIT MOD MDM: CPT | Performed by: EMERGENCY MEDICINE

## 2024-08-12 PROCEDURE — 99283 EMERGENCY DEPT VISIT LOW MDM: CPT | Performed by: EMERGENCY MEDICINE

## 2024-08-12 ASSESSMENT — COLUMBIA-SUICIDE SEVERITY RATING SCALE - C-SSRS
2. HAVE YOU ACTUALLY HAD ANY THOUGHTS OF KILLING YOURSELF?: NO
6. HAVE YOU EVER DONE ANYTHING, STARTED TO DO ANYTHING, OR PREPARED TO DO ANYTHING TO END YOUR LIFE?: NO
1. IN THE PAST MONTH, HAVE YOU WISHED YOU WERE DEAD OR WISHED YOU COULD GO TO SLEEP AND NOT WAKE UP?: NO

## 2024-08-12 ASSESSMENT — PAIN SCALES - GENERAL: PAINLEVEL_OUTOF10: 8

## 2024-08-12 ASSESSMENT — PAIN - FUNCTIONAL ASSESSMENT: PAIN_FUNCTIONAL_ASSESSMENT: 0-10

## 2024-08-13 PROCEDURE — 2500000001 HC RX 250 WO HCPCS SELF ADMINISTERED DRUGS (ALT 637 FOR MEDICARE OP): Mod: SE | Performed by: EMERGENCY MEDICINE

## 2024-08-13 RX ORDER — ACETAMINOPHEN 325 MG/1
975 TABLET ORAL ONCE
Status: COMPLETED | OUTPATIENT
Start: 2024-08-13 | End: 2024-08-13

## 2024-08-13 RX ORDER — OXYCODONE HYDROCHLORIDE 5 MG/1
5 TABLET ORAL EVERY 6 HOURS PRN
Qty: 7 TABLET | Refills: 0 | Status: SHIPPED | OUTPATIENT
Start: 2024-08-13 | End: 2024-08-16

## 2024-08-13 RX ORDER — ACETAMINOPHEN 500 MG
1000 TABLET ORAL EVERY 6 HOURS PRN
Qty: 30 TABLET | Refills: 0 | Status: SHIPPED | OUTPATIENT
Start: 2024-08-13 | End: 2024-08-23

## 2024-08-13 RX ORDER — IBUPROFEN 600 MG/1
600 TABLET ORAL EVERY 6 HOURS PRN
Qty: 20 TABLET | Refills: 0 | Status: SHIPPED | OUTPATIENT
Start: 2024-08-13 | End: 2024-08-20

## 2024-08-13 RX ORDER — OXYCODONE HYDROCHLORIDE 5 MG/1
5 TABLET ORAL ONCE
Status: COMPLETED | OUTPATIENT
Start: 2024-08-13 | End: 2024-08-13

## 2024-08-13 ASSESSMENT — PAIN SCALES - GENERAL: PAINLEVEL_OUTOF10: 10 - WORST POSSIBLE PAIN

## 2024-08-13 NOTE — ED PROVIDER NOTES
HPI   Chief Complaint   Patient presents with    Dental Pain       HPI  24-year-old presents for left upper tooth pain. Patient denies fever, productive cough, neck swelling, shortness of breath, chest pain.     Patient History   Past Medical History:   Diagnosis Date    Encounter for gynecological examination (general) (routine) without abnormal findings 09/21/2020    Well woman exam    Encounter for gynecological examination (general) (routine) without abnormal findings 09/21/2020    Encounter for well woman exam with routine gynecological exam    Unspecified maternal hypertension, first trimester ([facility])     High blood pressure affecting pregnancy in first trimester, antepartum     No past surgical history on file.  No family history on file.  Social History     Tobacco Use    Smoking status: Never    Smokeless tobacco: Never   Vaping Use    Vaping status: Not on file   Substance Use Topics    Alcohol use: Never    Drug use: Never       Physical Exam   ED Triage Vitals [08/12/24 2324]   Temperature Heart Rate Respirations BP   36.1 °C (97 °F) 73 18 (!) 138/93      Pulse Ox Temp Source Heart Rate Source Patient Position   99 % Temporal Monitor --      BP Location FiO2 (%)     -- --       Physical Exam  Vitals and nursing note reviewed.   Constitutional:       General: She is not in acute distress.     Appearance: She is well-developed.   HENT:      Head: Normocephalic and atraumatic.      Comments: Inflammation along gum line, no visible abscess, no notable cavity  Eyes:      Conjunctiva/sclera: Conjunctivae normal.   Cardiovascular:      Rate and Rhythm: Normal rate.   Pulmonary:      Effort: Pulmonary effort is normal. No respiratory distress.   Abdominal:      General: Abdomen is flat.   Musculoskeletal:         General: No swelling.      Right lower leg: No edema.      Left lower leg: No edema.   Skin:     General: Skin is warm.      Capillary Refill: Capillary refill takes less than 2 seconds.    Neurological:      Mental Status: She is alert.   Psychiatric:         Mood and Affect: Mood normal.           ED Course & MDM   Diagnoses as of 08/13/24 0033   Pain, dental   Pulpitis   Gingivitis   Toothache                 No data recorded     Zac Coma Scale Score: 15 (08/12/24 9635 : Morelia Ramirez, ROSA)                           Medical Decision Making  Patient presents for dental pain due to suspected pulpitis, gingivitis.    Patient not immunosuppressed, afebrile and well appearing with patent airway, have low suspicion for deep space infection or any concern for airway compromise. Based on history, physical, and work up. No evidence of tooth fracture, avulsion, or bleeding socket. No evidence of RPA, PTA, Jr’s angina, ulcerative gingivitis, alveolar osteitis, periapical abscess.     Instructed patient to continue to treat pain with ibuprofen/acetaminophen until they see a dentist. Defer ABX for dental pain alone with no overt evidence of infection.    Patient discharged home and will follow up with dentist. Given prescription for pain medication. Discussed return precautions for odontogenic infections and other dental pain emergencies. Provided dental clinic list.     Procedure  Procedures     Faith Eduardo MD  Resident  08/13/24 0033

## 2024-08-13 NOTE — DISCHARGE INSTRUCTIONS
.You are seen today for dental pain.      Please follow-up with one of the dental clinics.  You are provided with a list of dental clinics in the area. Please follow up with one when they are open.   Please take ibuprofen every 6 hours as needed and tylenol every 6 hours as needed.      Return if unable to tolerate p.o. intake, inability to open your mouth, worsening fevers, chills, worsening swelling.  Return if any new or oncerning symptoms

## 2024-09-06 ENCOUNTER — APPOINTMENT (OUTPATIENT)
Dept: OBSTETRICS AND GYNECOLOGY | Facility: CLINIC | Age: 24
End: 2024-09-06
Payer: COMMERCIAL

## 2024-11-08 ENCOUNTER — APPOINTMENT (OUTPATIENT)
Dept: PRIMARY CARE | Facility: CLINIC | Age: 24
End: 2024-11-08
Payer: COMMERCIAL

## 2024-12-04 ENCOUNTER — APPOINTMENT (OUTPATIENT)
Dept: PRIMARY CARE | Facility: CLINIC | Age: 24
End: 2024-12-04
Payer: COMMERCIAL

## 2025-03-14 ENCOUNTER — TELEMEDICINE (OUTPATIENT)
Dept: PRIMARY CARE | Facility: CLINIC | Age: 25
End: 2025-03-14
Payer: COMMERCIAL

## 2025-03-14 DIAGNOSIS — K04.7 DENTAL INFECTION: Primary | ICD-10-CM

## 2025-03-14 DIAGNOSIS — K08.89 PAIN, DENTAL: ICD-10-CM

## 2025-03-14 RX ORDER — AMOXICILLIN 500 MG/1
500 CAPSULE ORAL 3 TIMES DAILY
Qty: 15 CAPSULE | Refills: 0 | Status: SHIPPED | OUTPATIENT
Start: 2025-03-14 | End: 2025-03-19

## 2025-03-14 ASSESSMENT — ENCOUNTER SYMPTOMS
HEADACHES: 0
DIZZINESS: 0
ACTIVITY CHANGE: 0
MYALGIAS: 1
FATIGUE: 0
FEVER: 0
SHORTNESS OF BREATH: 0
APPETITE CHANGE: 0
LIGHT-HEADEDNESS: 0

## 2025-03-14 NOTE — PROGRESS NOTES
Subjective   Patient ID: India Naidu is a 24 y.o. female who presents for Dental Pain (Onset Tuesday).    Left bottom tooth pain/ache  Swelling, pain by ear/jaw on left   She saw dentist a few months ago who recommend she have wisdom teeth removed and states she had a broken tooth however they did not accept her insurance, she has found a new dentist but they are booked out untill July   Dentist appointment is scheduled for 7/21/2025  Pain started as a throbbing pain, then 2 days later pain worsened and she   Denies fever, drainage, redness  Sh ehas been using ice which is helping pian/swelling     Dental Pain   Pertinent negatives include no fever.        Review of Systems   Constitutional:  Negative for activity change, appetite change, fatigue and fever.   HENT:  Positive for dental problem.    Respiratory:  Negative for shortness of breath.    Cardiovascular:  Negative for chest pain.   Musculoskeletal:  Positive for myalgias.   Neurological:  Negative for dizziness, light-headedness and headaches.       Objective   There were no vitals taken for this visit.    Physical Exam  Constitutional:       General: She is not in acute distress.     Appearance: Normal appearance. She is not ill-appearing.      Comments: On Demand Virtual Visit Patient Consent     An interactive audio and video telecommunication system which permits real time communications between the patient (at the originating site) and provider (at the distant site) was utilized to provide this telehealth service.   Verbal consent was requested and obtained from India Naidu (or parent if under 18) on this date, for a telehealth visit.   I have verbally confirmed with India Naidu (or parent if under 18) that they are physically located in the [facility] during this virtual visit.    I performed this visit using realtime telehealth tools, including an audio/video OR telephone connection between the patient listed who was located in the [facility]  [place] and myself, Dre Solitario CNP (licensed in the state [facility]).  At the start of the visit, I introduced myself as Dre Solitario, Nurse practitioner and verified the patients name, , and current physical location.    If they were currently outside of the state of [place], the visit was ended and the patient was referred to alternative means for evaluation and treatment.   The patient was made aware of the limitations of the telehealth visit.  They will not be physically examined and all issues may not be appropriate for a telehealth visit.  If necessary, an in person referral will be made.       DISCLAIMER:   In preparing for this visit and writing this note, I reviewed previous electronic medical records (labs, imaging and medical charts) available.  Significant findings which helped in decision making are recorded in this encounter charting.     Pulmonary:      Effort: Pulmonary effort is normal.   Neurological:      Mental Status: She is alert and oriented to person, place, and time.         Assessment/Plan   Diagnoses and all orders for this visit:  Dental infection  -     amoxicillin (Amoxil) 500 mg capsule; Take 1 capsule (500 mg) by mouth 3 times a day for 5 days.  Pain, dental  Continue to use ice as needed and OTC pain relievers as needed  Keep dental appointment

## 2025-03-14 NOTE — PATIENT INSTRUCTIONS
Pleasure meeting with you today!    Let me know if you need anything.     Please send me a MyChart message if you have any questions or concerns.  FOR NON URGENT questions only.  Allow up to 72 hours for response.     If you have prescription issues or other questions you can email   Mariluz Hoskins,  Digital Health Coordinator, at   [email or web address]

## 2025-05-12 ENCOUNTER — APPOINTMENT (OUTPATIENT)
Dept: OBSTETRICS AND GYNECOLOGY | Facility: HOSPITAL | Age: 25
End: 2025-05-12
Payer: COMMERCIAL

## 2025-05-21 ENCOUNTER — APPOINTMENT (OUTPATIENT)
Dept: PRIMARY CARE | Facility: CLINIC | Age: 25
End: 2025-05-21
Payer: COMMERCIAL

## 2025-05-22 ENCOUNTER — APPOINTMENT (OUTPATIENT)
Dept: PRIMARY CARE | Facility: CLINIC | Age: 25
End: 2025-05-22
Payer: COMMERCIAL

## 2025-06-17 ENCOUNTER — APPOINTMENT (OUTPATIENT)
Dept: OBSTETRICS AND GYNECOLOGY | Facility: HOSPITAL | Age: 25
End: 2025-06-17
Payer: COMMERCIAL

## 2025-06-24 ENCOUNTER — APPOINTMENT (OUTPATIENT)
Dept: PRIMARY CARE | Facility: CLINIC | Age: 25
End: 2025-06-24
Payer: COMMERCIAL

## 2025-06-24 ENCOUNTER — APPOINTMENT (OUTPATIENT)
Dept: RADIOLOGY | Facility: HOSPITAL | Age: 25
End: 2025-06-24
Payer: COMMERCIAL

## 2025-06-24 ENCOUNTER — HOSPITAL ENCOUNTER (EMERGENCY)
Facility: HOSPITAL | Age: 25
Discharge: HOME | End: 2025-06-24
Payer: COMMERCIAL

## 2025-06-24 VITALS
BODY MASS INDEX: 33.38 KG/M2 | HEIGHT: 60 IN | SYSTOLIC BLOOD PRESSURE: 155 MMHG | HEART RATE: 72 BPM | OXYGEN SATURATION: 97 % | TEMPERATURE: 97.5 F | RESPIRATION RATE: 16 BRPM | WEIGHT: 170 LBS | DIASTOLIC BLOOD PRESSURE: 84 MMHG

## 2025-06-24 DIAGNOSIS — R07.89 OTHER CHEST PAIN: Primary | ICD-10-CM

## 2025-06-24 LAB
ALBUMIN SERPL BCP-MCNC: 4.4 G/DL (ref 3.4–5)
ALP SERPL-CCNC: 80 U/L (ref 33–110)
ALT SERPL W P-5'-P-CCNC: 9 U/L (ref 7–45)
ANION GAP SERPL CALC-SCNC: 14 MMOL/L (ref 10–20)
APPEARANCE UR: ABNORMAL
AST SERPL W P-5'-P-CCNC: 18 U/L (ref 9–39)
BASOPHILS # BLD AUTO: 0.05 X10*3/UL (ref 0–0.1)
BASOPHILS NFR BLD AUTO: 0.8 %
BILIRUB SERPL-MCNC: 0.2 MG/DL (ref 0–1.2)
BILIRUB UR STRIP.AUTO-MCNC: NEGATIVE MG/DL
BNP SERPL-MCNC: 8 PG/ML (ref 0–99)
BUN SERPL-MCNC: 19 MG/DL (ref 6–23)
CALCIUM SERPL-MCNC: 9.3 MG/DL (ref 8.6–10.6)
CARDIAC TROPONIN I PNL SERPL HS: <3 NG/L (ref 0–34)
CARDIAC TROPONIN I PNL SERPL HS: <3 NG/L (ref 0–34)
CHLORIDE SERPL-SCNC: 104 MMOL/L (ref 98–107)
CO2 SERPL-SCNC: 24 MMOL/L (ref 21–32)
COLOR UR: ABNORMAL
CREAT SERPL-MCNC: 0.71 MG/DL (ref 0.5–1.05)
D DIMER PPP FEU-MCNC: 304 NG/ML FEU
EGFRCR SERPLBLD CKD-EPI 2021: >90 ML/MIN/1.73M*2
EOSINOPHIL # BLD AUTO: 0.12 X10*3/UL (ref 0–0.7)
EOSINOPHIL NFR BLD AUTO: 1.9 %
ERYTHROCYTE [DISTWIDTH] IN BLOOD BY AUTOMATED COUNT: 15.3 % (ref 11.5–14.5)
GLUCOSE SERPL-MCNC: 88 MG/DL (ref 74–99)
GLUCOSE UR STRIP.AUTO-MCNC: NORMAL MG/DL
HCT VFR BLD AUTO: 35.4 % (ref 36–46)
HGB BLD-MCNC: 11.5 G/DL (ref 12–16)
IMM GRANULOCYTES # BLD AUTO: 0.03 X10*3/UL (ref 0–0.7)
IMM GRANULOCYTES NFR BLD AUTO: 0.5 % (ref 0–0.9)
KETONES UR STRIP.AUTO-MCNC: NEGATIVE MG/DL
LEUKOCYTE ESTERASE UR QL STRIP.AUTO: ABNORMAL
LYMPHOCYTES # BLD AUTO: 2.41 X10*3/UL (ref 1.2–4.8)
LYMPHOCYTES NFR BLD AUTO: 37.5 %
MCH RBC QN AUTO: 26.7 PG (ref 26–34)
MCHC RBC AUTO-ENTMCNC: 32.5 G/DL (ref 32–36)
MCV RBC AUTO: 82 FL (ref 80–100)
MONOCYTES # BLD AUTO: 0.54 X10*3/UL (ref 0.1–1)
MONOCYTES NFR BLD AUTO: 8.4 %
MUCOUS THREADS #/AREA URNS AUTO: ABNORMAL /LPF
NEUTROPHILS # BLD AUTO: 3.28 X10*3/UL (ref 1.2–7.7)
NEUTROPHILS NFR BLD AUTO: 50.9 %
NITRITE UR QL STRIP.AUTO: NEGATIVE
NRBC BLD-RTO: 0 /100 WBCS (ref 0–0)
PH UR STRIP.AUTO: 7 [PH]
PLATELET # BLD AUTO: 224 X10*3/UL (ref 150–450)
POTASSIUM SERPL-SCNC: 4.2 MMOL/L (ref 3.5–5.3)
PREGNANCY TEST URINE, POC: NEGATIVE
PROT SERPL-MCNC: 7.4 G/DL (ref 6.4–8.2)
PROT UR STRIP.AUTO-MCNC: NEGATIVE MG/DL
RBC # BLD AUTO: 4.31 X10*6/UL (ref 4–5.2)
RBC # UR STRIP.AUTO: NEGATIVE MG/DL
RBC #/AREA URNS AUTO: ABNORMAL /HPF
SODIUM SERPL-SCNC: 138 MMOL/L (ref 136–145)
SP GR UR STRIP.AUTO: 1.02
SQUAMOUS #/AREA URNS AUTO: ABNORMAL /HPF
UROBILINOGEN UR STRIP.AUTO-MCNC: NORMAL MG/DL
WBC # BLD AUTO: 6.4 X10*3/UL (ref 4.4–11.3)
WBC #/AREA URNS AUTO: ABNORMAL /HPF

## 2025-06-24 PROCEDURE — 84075 ASSAY ALKALINE PHOSPHATASE: CPT | Performed by: NURSE PRACTITIONER

## 2025-06-24 PROCEDURE — 96374 THER/PROPH/DIAG INJ IV PUSH: CPT

## 2025-06-24 PROCEDURE — 83880 ASSAY OF NATRIURETIC PEPTIDE: CPT | Performed by: NURSE PRACTITIONER

## 2025-06-24 PROCEDURE — 2500000004 HC RX 250 GENERAL PHARMACY W/ HCPCS (ALT 636 FOR OP/ED): Mod: JZ,SE | Performed by: NURSE PRACTITIONER

## 2025-06-24 PROCEDURE — 84484 ASSAY OF TROPONIN QUANT: CPT | Performed by: NURSE PRACTITIONER

## 2025-06-24 PROCEDURE — 99284 EMERGENCY DEPT VISIT MOD MDM: CPT | Mod: 25

## 2025-06-24 PROCEDURE — 87086 URINE CULTURE/COLONY COUNT: CPT | Performed by: NURSE PRACTITIONER

## 2025-06-24 PROCEDURE — 81001 URINALYSIS AUTO W/SCOPE: CPT | Performed by: NURSE PRACTITIONER

## 2025-06-24 PROCEDURE — 71046 X-RAY EXAM CHEST 2 VIEWS: CPT

## 2025-06-24 PROCEDURE — 36415 COLL VENOUS BLD VENIPUNCTURE: CPT | Performed by: NURSE PRACTITIONER

## 2025-06-24 PROCEDURE — 99285 EMERGENCY DEPT VISIT HI MDM: CPT | Performed by: NURSE PRACTITIONER

## 2025-06-24 PROCEDURE — 71046 X-RAY EXAM CHEST 2 VIEWS: CPT | Performed by: RADIOLOGY

## 2025-06-24 PROCEDURE — 85025 COMPLETE CBC W/AUTO DIFF WBC: CPT | Performed by: NURSE PRACTITIONER

## 2025-06-24 PROCEDURE — 81025 URINE PREGNANCY TEST: CPT | Performed by: NURSE PRACTITIONER

## 2025-06-24 PROCEDURE — 85379 FIBRIN DEGRADATION QUANT: CPT | Performed by: NURSE PRACTITIONER

## 2025-06-24 RX ORDER — KETOROLAC TROMETHAMINE 15 MG/ML
15 INJECTION, SOLUTION INTRAMUSCULAR; INTRAVENOUS ONCE
Status: COMPLETED | OUTPATIENT
Start: 2025-06-24 | End: 2025-06-24

## 2025-06-24 RX ORDER — NAPROXEN 500 MG/1
500 TABLET ORAL 2 TIMES DAILY PRN
Qty: 14 TABLET | Refills: 0 | Status: SHIPPED | OUTPATIENT
Start: 2025-06-24 | End: 2025-07-01

## 2025-06-24 RX ADMIN — KETOROLAC TROMETHAMINE 15 MG: 15 INJECTION, SOLUTION INTRAMUSCULAR; INTRAVENOUS at 07:52

## 2025-06-24 ASSESSMENT — LIFESTYLE VARIABLES
EVER HAD A DRINK FIRST THING IN THE MORNING TO STEADY YOUR NERVES TO GET RID OF A HANGOVER: NO
HAVE PEOPLE ANNOYED YOU BY CRITICIZING YOUR DRINKING: NO
HAVE YOU EVER FELT YOU SHOULD CUT DOWN ON YOUR DRINKING: NO
EVER FELT BAD OR GUILTY ABOUT YOUR DRINKING: NO
TOTAL SCORE: 0

## 2025-06-24 ASSESSMENT — PAIN - FUNCTIONAL ASSESSMENT
PAIN_FUNCTIONAL_ASSESSMENT: 0-10
PAIN_FUNCTIONAL_ASSESSMENT: 0-10

## 2025-06-24 ASSESSMENT — PAIN DESCRIPTION - LOCATION
LOCATION: CHEST
LOCATION: CHEST

## 2025-06-24 ASSESSMENT — PAIN SCALES - GENERAL
PAINLEVEL_OUTOF10: 10 - WORST POSSIBLE PAIN
PAINLEVEL_OUTOF10: 3

## 2025-06-24 ASSESSMENT — PAIN DESCRIPTION - PAIN TYPE: TYPE: ACUTE PAIN

## 2025-06-24 ASSESSMENT — PAIN DESCRIPTION - ORIENTATION: ORIENTATION: LEFT

## 2025-06-24 ASSESSMENT — ENCOUNTER SYMPTOMS: SHORTNESS OF BREATH: 1

## 2025-06-24 NOTE — ED TRIAGE NOTES
Pt presents to ED with 10/10 mid sternal chest pain that radiates under her left breast that started this morning when she was getting dressed for work. Pt states that this has happened before in the past but she is usually able to sit through it

## 2025-06-24 NOTE — ED PROVIDER NOTES
"Emergency Department Encounter  [facility] EMERGENCY MEDICINE    Patient: India Naidu  MRN: [id number]  : 2000  Date of Evaluation: 2025  ED Provider: JUAN JOSE Miller      Chief Complaint       Chief Complaint   Patient presents with    Chest Pain        Limitations to History: none  Historian: patient  Records reviewed: EMR inpatient and outpatient notes, Care Everywhere    This is a 25-year-old female with no significant PMH who presents to the emergency room with chest pain.  Patient states that she developed midsternal to left-sided chest pain this morning.  Patient feels as if there is a \"knot\" to her left chest.  Patient states this happened previously but was concerned because it lasted 10 minutes today.  Pain is worse with certain movements.  Denies any recent heavy lifting, pushing or pulling.  Denies taking any over-the-counter medications prior to arrival.  Patient denies any recent travel, lower extremity pain, lower extremity swelling or use of birth control medications.  Denies any cough.  Patient states that the pain does radiate down her left arm.    PMH: Denies  PSH: Denies  Allergies: NKDA  Social HX: Denies smoking, occasional alcohol use, denies any drug use.  Family HX: No family history pertinent to current presenting problem  Medications: Reviewed per EMR    ROS:     Review of Systems   Respiratory:  Positive for shortness of breath.    Cardiovascular:  Positive for chest pain.     14 systems reviewed and otherwise acutely negative except as in the Tribe.        Past History   Medical History[1]  Surgical History[2]      Medications/Allergies     Previous Medications    No medications on file     Allergies[3]     Physical Exam       ED Triage Vitals [25 0718]   Temperature Heart Rate Respirations BP   36.4 °C (97.5 °F) 72 16 155/84      Pulse Ox Temp Source Heart Rate Source Patient Position   97 % Temporal Monitor Sitting      BP Location FiO2 (%)   "   Left arm --       Physical Exam:    Appearance: Alert, oriented , cooperative,  in no acute distress. Well nourished & well hydrated.    Skin: Intact,  dry skin, no lesions, rash, petechiae or purpura.     Eyes: PERRLA, EOMs intact.    ENT: Hearing grossly intact. External auditory canals patent, tympanic membranes intact with visible landmarks. Nares patent, mucus membranes moist. Dentition without lesions. Pharynx clear, uvula midline.     Neck: Supple, without meningismus.     Pulmonary: Clear bilaterally with good chest wall excursion. No rales, rhonchi or wheezing. No accessory muscle use or stridor.    Cardiac: Normal S1, S2 without murmur, rub, gallop or extrasystole. No JVD, Carotids without bruits.    Chest: Mild tenderness to the left chest.    Abdomen: Soft, nontender, active bowel sounds.  No palpable organomegaly.  No rebound or guarding.     Genitourinary: Exam deferred.    Musculoskeletal: Full range of motion. no pain, edema, or deformity. Pulses full and equal. No cyanosis, clubbing, or edema.    Neurological:  Normal sensation, no weakness, no focal findings identified.    Psychiatric: Appropriate mood and affect.       Diagnostics   Labs:  Results for orders placed or performed during the hospital encounter of 06/24/25 (from the past 24 hours)   CBC and Auto Differential   Result Value Ref Range    WBC 6.4 4.4 - 11.3 x10*3/uL    nRBC 0.0 0.0 - 0.0 /100 WBCs    RBC 4.31 4.00 - 5.20 x10*6/uL    Hemoglobin 11.5 (L) 12.0 - 16.0 g/dL    Hematocrit 35.4 (L) 36.0 - 46.0 %    MCV 82 80 - 100 fL    MCH 26.7 26.0 - 34.0 pg    MCHC 32.5 32.0 - 36.0 g/dL    RDW 15.3 (H) 11.5 - 14.5 %    Platelets 224 150 - 450 x10*3/uL    Neutrophils % 50.9 40.0 - 80.0 %    Immature Granulocytes %, Automated 0.5 0.0 - 0.9 %    Lymphocytes % 37.5 13.0 - 44.0 %    Monocytes % 8.4 2.0 - 10.0 %    Eosinophils % 1.9 0.0 - 6.0 %    Basophils % 0.8 0.0 - 2.0 %    Neutrophils Absolute 3.28 1.20 - 7.70 x10*3/uL    Immature  Granulocytes Absolute, Automated 0.03 0.00 - 0.70 x10*3/uL    Lymphocytes Absolute 2.41 1.20 - 4.80 x10*3/uL    Monocytes Absolute 0.54 0.10 - 1.00 x10*3/uL    Eosinophils Absolute 0.12 0.00 - 0.70 x10*3/uL    Basophils Absolute 0.05 0.00 - 0.10 x10*3/uL   Comprehensive metabolic panel   Result Value Ref Range    Glucose 88 74 - 99 mg/dL    Sodium 138 136 - 145 mmol/L    Potassium 4.2 3.5 - 5.3 mmol/L    Chloride 104 98 - 107 mmol/L    Bicarbonate 24 21 - 32 mmol/L    Anion Gap 14 10 - 20 mmol/L    Urea Nitrogen 19 6 - 23 mg/dL    Creatinine 0.71 0.50 - 1.05 mg/dL    eGFR >90 >60 mL/min/1.73m*2    Calcium 9.3 8.6 - 10.6 mg/dL    Albumin 4.4 3.4 - 5.0 g/dL    Alkaline Phosphatase 80 33 - 110 U/L    Total Protein 7.4 6.4 - 8.2 g/dL    AST 18 9 - 39 U/L    Bilirubin, Total 0.2 0.0 - 1.2 mg/dL    ALT 9 7 - 45 U/L   B-Type Natriuretic Peptide   Result Value Ref Range    BNP 8 0 - 99 pg/mL   D-Dimer, VTE Exclusion   Result Value Ref Range    D-Dimer, Quantitative VTE Exclusion 304 <=500 ng/mL FEU   Troponin I, High Sensitivity, Initial   Result Value Ref Range    Troponin I, High Sensitivity (CMC) <3 0 - 34 ng/L   POCT pregnancy, urine   Result Value Ref Range    Preg Test, Ur Negative    Urinalysis with Reflex Culture and Microscopic   Result Value Ref Range    Color, Urine Light-Yellow Light-Yellow, Yellow, Dark-Yellow    Appearance, Urine Turbid (N) Clear    Specific Gravity, Urine 1.022 1.005 - 1.035    pH, Urine 7.0 5.0, 5.5, 6.0, 6.5, 7.0, 7.5, 8.0    Protein, Urine NEGATIVE NEGATIVE, 10 (TRACE), 20 (TRACE) mg/dL    Glucose, Urine Normal Normal mg/dL    Blood, Urine NEGATIVE NEGATIVE mg/dL    Ketones, Urine NEGATIVE NEGATIVE mg/dL    Bilirubin, Urine NEGATIVE NEGATIVE mg/dL    Urobilinogen, Urine Normal Normal mg/dL    Nitrite, Urine NEGATIVE NEGATIVE    Leukocyte Esterase, Urine 25 Anne/uL (A) NEGATIVE   Microscopic Only, Urine   Result Value Ref Range    WBC, Urine 6-10 (A) 1-5, NONE /HPF    RBC, Urine 1-2 NONE,  1-2, 3-5 /HPF    Squamous Epithelial Cells, Urine 10-25 (FEW) Reference range not established. /HPF    Mucus, Urine FEW Reference range not established. /LPF   Troponin, High Sensitivity, 1 Hour   Result Value Ref Range    Troponin I, High Sensitivity (CMC) <3 0 - 34 ng/L      Radiographs:  XR chest 2 views   Final Result   1. No acute cardiopulmonary process.        MACRO:   None.        Signed by: Chanel Gamez 6/24/2025 9:18 AM   Dictation workstation:   [id number]          Procedures:   Procedures     EKG: interpreted by this provider  Time: 0718  Indication: chest pain  Rate: 69  Rhythm: NSR  Interval: 144  Qtc: 402      Assessment   In brief, India Naidu is a 25 y.o. female who presented to the emergency department with chest pain.        ED Course/MDM     Diagnoses as of 06/24/25 1000   Other chest pain      Visit Vitals  /84 (BP Location: Left arm, Patient Position: Sitting)   Pulse 72   Temp 36.4 °C (97.5 °F) (Temporal)   Resp 16   Ht 1.524 m (5')   Wt 77.1 kg (170 lb)   SpO2 97%   BMI 33.20 kg/m²   OB Status Recent pregnancy   Smoking Status Never   BSA 1.81 m²       Medications   ketorolac (Toradol) injection 15 mg (15 mg intravenous Given 6/24/25 0752)       Patient remained stable while in the emergency department. Previous outpatient and ED records were reviewed. Outside records were reviewed.  Differentials include MI, ACS, PE, musculoskeletal pain, nonspecific chest pain.  IV established and labs obtained.  CBC with a normal white blood cell count of 6.4.  Hemoglobin hematocrit slightly low but stable at 11.5 and 35.4.  Comprehensive metabolic panel within normal limits.  BNP was normal at 8.  VTE exclusion D-dimer was 304.  Troponin x 2 was less than 3 and urine pregnancy test was negative.  Urinalysis showed 25 leukocytes, 6-10 white cells and 10-25 squamous epithelial cells.  Patient did not have any urinary symptoms to suggest UTI.  Chest x-ray shows no acute cardiopulmonary process.   Patient received Toradol 15 mg IV once for pain.  Patient reports improvement of symptoms after receiving medications.  PERC score was reviewed, 0 and VTE exclusion D-dimer was 304.  EKG was reassuring which showed normal sinus rhythm.  Discussed with the patient findings today.  Heart score was 0.  Advised the patient to follow-up with her primary care doctor outpatient for further workup and management including a possible stress test.  Patient was advised to return the emergency room with worsening symptoms.    Final Impression      1. Other chest pain          DISPOSITION  Disposition: Discharged home    Comment: Please note this report has been produced using speech recognition software and may contain errors related to that system including errors in grammar, punctuation, and spelling, as well as words and phrases that may be inappropriate.  If there are any questions or concerns please feel free to contact the dictating provider for clarification.    KERI Miller-PAWAN             [1]   Past Medical History:  Diagnosis Date    Encounter for gynecological examination (general) (routine) without abnormal findings 09/21/2020    Well woman exam    Encounter for gynecological examination (general) (routine) without abnormal findings 09/21/2020    Encounter for well woman exam with routine gynecological exam    Unspecified maternal hypertension, first trimester ([facility])     High blood pressure affecting pregnancy in first trimester, antepartum   [2] History reviewed. No pertinent surgical history.  [3] No Known Allergies       JUAN JOSE Miller  06/24/25 100

## 2025-06-25 LAB
BACTERIA UR CULT: NORMAL
HOLD SPECIMEN: NORMAL

## 2025-07-11 ENCOUNTER — APPOINTMENT (OUTPATIENT)
Dept: PRIMARY CARE | Facility: CLINIC | Age: 25
End: 2025-07-11
Payer: COMMERCIAL

## 2025-07-14 ENCOUNTER — APPOINTMENT (OUTPATIENT)
Dept: PRIMARY CARE | Facility: CLINIC | Age: 25
End: 2025-07-14
Payer: COMMERCIAL

## 2025-07-18 ENCOUNTER — APPOINTMENT (OUTPATIENT)
Dept: PRIMARY CARE | Facility: CLINIC | Age: 25
End: 2025-07-18
Payer: COMMERCIAL

## 2025-07-18 VITALS
HEART RATE: 70 BPM | WEIGHT: 172 LBS | BODY MASS INDEX: 33.77 KG/M2 | DIASTOLIC BLOOD PRESSURE: 66 MMHG | HEIGHT: 60 IN | OXYGEN SATURATION: 99 % | SYSTOLIC BLOOD PRESSURE: 103 MMHG

## 2025-07-18 DIAGNOSIS — Z00.00 ANNUAL PHYSICAL EXAM: Primary | ICD-10-CM

## 2025-07-18 DIAGNOSIS — Z76.89 ENCOUNTER TO ESTABLISH CARE: ICD-10-CM

## 2025-07-18 DIAGNOSIS — F41.9 ANXIETY: ICD-10-CM

## 2025-07-18 PROCEDURE — 3008F BODY MASS INDEX DOCD: CPT

## 2025-07-18 PROCEDURE — 99395 PREV VISIT EST AGE 18-39: CPT

## 2025-07-18 PROCEDURE — 3078F DIAST BP <80 MM HG: CPT

## 2025-07-18 PROCEDURE — 3074F SYST BP LT 130 MM HG: CPT

## 2025-07-18 RX ORDER — SPIRONOLACTONE 25 MG/1
TABLET ORAL
COMMUNITY
Start: 2025-07-11

## 2025-07-18 ASSESSMENT — ENCOUNTER SYMPTOMS
EYE ITCHING: 0
EYE PAIN: 0
ABDOMINAL PAIN: 0
VOMITING: 0
NAUSEA: 0
FATIGUE: 0
LIGHT-HEADEDNESS: 0
SINUS PAIN: 0
COUGH: 0
SEIZURES: 0
SINUS PRESSURE: 0
WEAKNESS: 0
FREQUENCY: 0
HEMATURIA: 0
LOSS OF SENSATION IN FEET: 0
WHEEZING: 0
MYALGIAS: 0
HEADACHES: 0
BRUISES/BLEEDS EASILY: 0
NUMBNESS: 0
CONSTIPATION: 0
DEPRESSION: 0
DIARRHEA: 0
NERVOUS/ANXIOUS: 0
NECK PAIN: 0
BACK PAIN: 0
OCCASIONAL FEELINGS OF UNSTEADINESS: 0
DIZZINESS: 0
POLYDIPSIA: 0
DIFFICULTY URINATING: 0
BLOOD IN STOOL: 0
CHILLS: 0
EYE REDNESS: 0
SHORTNESS OF BREATH: 0
CONFUSION: 0
PALPITATIONS: 0
CHEST TIGHTNESS: 0
TROUBLE SWALLOWING: 0
DYSURIA: 0
FEVER: 0

## 2025-07-18 ASSESSMENT — PATIENT HEALTH QUESTIONNAIRE - PHQ9
1. LITTLE INTEREST OR PLEASURE IN DOING THINGS: NOT AT ALL
SUM OF ALL RESPONSES TO PHQ9 QUESTIONS 1 AND 2: 0
2. FEELING DOWN, DEPRESSED OR HOPELESS: NOT AT ALL

## 2025-07-18 ASSESSMENT — PAIN SCALES - GENERAL: PAINLEVEL_OUTOF10: 0-NO PAIN

## 2025-07-18 NOTE — PROGRESS NOTES
Subjective   Patient ID: [place] Naidu is a 25 y.o. female who presents for Establish Care (NPV).  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   Specialists- needs gyn , goes to [facility] for acne   Due dental and vision due- referral placed     PMhx significant medical hx   -hx acne- just started spironolcatone 25 mg a few days ago- was on doxy -was causing upset stomach and was getting UTI- was on doxy for 6 months- only did it for 3 months- uses clindamycin cream and differin gel at night - through [facility]     -hx preeclampsia- was induced early -     -weight- She has been trying to lose weight- has gained weight recently - has gained about 30 pounds in the last year - she does not follow any specific diet- she eats mcdonalds and wend[employer] often- she also has not really been exercising- she is interested in talking with a nutritionist     -hx frequent UTIs- is getting them twice a month for the past 6 months- denies any current symptoms      -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- when raining or cold they hurt- no pain currently- happens a few times a year-  aching pain- will move around which helps- no numbness or tingling- goes away on its own     -hx anxiety- has been going on since having her daughter- she gets anxious and feels overwhelmed when she has a lot going on- she is currently in the process of moving- she does not know where she is moving to - she has been looking for a car too- had heightened anxiety about a month ago- her daughter's father was trying to get custody of her about a month ago- and she had an argument with her mom- took her an hour to calm down-her daughter's father  has not seen or called in a couple months - feels like she has a weight off her shoulders - feels like she has a good support system with mom and sister      -was in the ED  6/24- chest pain- thinks it was because of what she ate had hot chips - lasted for 15 minutes- subsided in ER- no symptoms since    -hx anemia- never been on iron -     PShx: D & C 2023  Social hx: etoh- rare few times a year , no tobacco use, no illicit drug use   No specific diet and exercise- infrequent -  Immunizations- gets flu annually- January 2025 got covid- does not want covid anymore- will get flu through work in fall- tdap 2021   Screenings-  -pap- has had a pap- believes it was around 2024- states it was negative-  will call and make appt with gyn-   -LMP- current - not on birth control - was on depo- heavier menses- regular   FMhx: mother- htn, was recently put on blood thinners she thinks- is unsure why- 50 yo father- unsure- healthy per pt   siblings - sister and brother with heart murmurs as children- outgrew them     Past medical, surgical, social, and family history all reviewed     patient states feeling well, no complaints at this time   denies N/V, headache, dizziness, CP, SOB, palpitations, edema, numbness, tingling, weakness      Review of Systems   Constitutional:  Negative for chills, fatigue and fever.   HENT:  Negative for congestion, ear pain, sinus pressure, sinus pain and trouble swallowing.    Eyes:  Negative for pain, redness and itching.   Respiratory:  Negative for cough, chest tightness, shortness of breath and wheezing.    Cardiovascular:  Negative for chest pain and palpitations.   Gastrointestinal:  Negative for abdominal pain, blood in stool, constipation, diarrhea, nausea and vomiting.   Endocrine: Negative for cold intolerance, polydipsia and polyuria.   Genitourinary:  Negative for difficulty urinating, dysuria, frequency and hematuria.   Musculoskeletal:  Negative for back pain, myalgias and neck pain.         arthritis in ankles   Skin:  Negative for rash.        Hx acne   Neurological:  Negative for dizziness, seizures, syncope, weakness, light-headedness, numbness and  headaches.   Hematological:  Does not bruise/bleed easily.   Psychiatric/Behavioral:  Negative for confusion. The patient is not nervous/anxious.         Hx anxiety       Objective   /66 (BP Location: Left arm, Patient Position: Sitting, BP Cuff Size: Large adult)   Pulse 70   Ht (!) 1.524 m (5')   Wt 78 kg (172 lb)   SpO2 99%   BMI 33.59 kg/m²     Physical Exam  Vitals and nursing note reviewed.   Constitutional:       Appearance: Normal appearance. She is obese.   HENT:      Head: Normocephalic and atraumatic.      Right Ear: Tympanic membrane, ear canal and external ear normal.      Left Ear: Tympanic membrane, ear canal and external ear normal.      Nose: Nose normal.      Mouth/Throat:      Mouth: Mucous membranes are moist.      Pharynx: Oropharynx is clear.     Eyes:      Extraocular Movements: Extraocular movements intact.      Conjunctiva/sclera: Conjunctivae normal.      Pupils: Pupils are equal, round, and reactive to light.       Cardiovascular:      Rate and Rhythm: Normal rate and regular rhythm.      Pulses: Normal pulses.      Heart sounds: Normal heart sounds.   Pulmonary:      Effort: Pulmonary effort is normal.      Breath sounds: Normal breath sounds.   Abdominal:      Palpations: Abdomen is soft.      Tenderness: There is no abdominal tenderness. There is no guarding.      Comments: BS normal   Genitourinary:     Comments: Per gyn      Musculoskeletal:         General: Normal range of motion.      Cervical back: Normal range of motion. No rigidity or tenderness.   Lymphadenopathy:      Cervical: No cervical adenopathy.     Skin:     General: Skin is warm and dry.     Neurological:      General: No focal deficit present.      Mental Status: She is alert and oriented to person, place, and time. Mental status is at baseline.     Psychiatric:         Mood and Affect: Mood normal.         Behavior: Behavior normal.         Thought Content: Thought content normal.         Judgment: Judgment  normal.         Assessment & Plan  Annual physical exam  -educated on importance of heart healthy diet and daily exercise  -pt can receive flu vaccine in fall  -pt will schedule  a follow up visit in 3-4 months   -she will call and make an appt with gyn and psychiatry   -will follow up on results    Orders:    Referral to Gynecology; Future    Referral to Nutrition Services; Future    Referral to Ophthalmology; Future    CBC; Future    Lipid Panel; Future    Comprehensive Metabolic Panel; Future    Iron and TIBC; Future    TSH with reflex to Free T4 if abnormal; Future    Anxiety  -will make appt with psychiatry    Orders:    Referral to Psychiatry; Future    Encounter to establish care         BMI 33.0-33.9,adult  -pt is interested in speaking with a nutritionist - she will call and make appt  -educated on heart healthy diet and regular, daily exercise- limit fast foods         complexity visit of 40+  minutes face-to-face with at least half of the time discussing  Results of my examination, clinical findings, impression and diagnoses discussed with the patient as well as results of the latest test/lab work. The patient was in agreement with the plan and verbalized a good understanding of instructions and plans for treatment. At the end of the visit today, the patient felt that all questions had been answered satisfactorily. The patient was pleased with the visit and very appreciative for the care rendered.          -Go to the ER with any new or worsening symptoms, such as chest pain, SOB, dizziness, numbness, headache, blurry vision          Stacia Cerda PA-C 07/27/25 5:28 PM

## 2025-08-19 ENCOUNTER — APPOINTMENT (OUTPATIENT)
Dept: PRIMARY CARE | Facility: CLINIC | Age: 25
End: 2025-08-19
Payer: COMMERCIAL

## 2025-08-19 ENCOUNTER — NUTRITION (OUTPATIENT)
Dept: NUTRITION | Facility: HOSPITAL | Age: 25
End: 2025-08-19
Payer: COMMERCIAL

## 2025-08-19 VITALS — HEIGHT: 60 IN | BODY MASS INDEX: 33.59 KG/M2

## 2025-08-19 DIAGNOSIS — Z71.3 DIETARY COUNSELING AND SURVEILLANCE: ICD-10-CM

## 2025-08-19 DIAGNOSIS — Z00.00 ANNUAL PHYSICAL EXAM: Primary | ICD-10-CM

## 2025-08-19 PROCEDURE — 97802 MEDICAL NUTRITION INDIV IN: CPT

## 2025-09-03 ENCOUNTER — APPOINTMENT (OUTPATIENT)
Dept: OBSTETRICS AND GYNECOLOGY | Facility: CLINIC | Age: 25
End: 2025-09-03
Payer: COMMERCIAL

## 2025-09-05 ENCOUNTER — APPOINTMENT (OUTPATIENT)
Dept: OBSTETRICS AND GYNECOLOGY | Facility: CLINIC | Age: 25
End: 2025-09-05
Payer: COMMERCIAL

## 2025-09-11 ENCOUNTER — APPOINTMENT (OUTPATIENT)
Dept: OPHTHALMOLOGY | Facility: CLINIC | Age: 25
End: 2025-09-11
Payer: COMMERCIAL

## 2025-10-21 ENCOUNTER — APPOINTMENT (OUTPATIENT)
Dept: PRIMARY CARE | Facility: CLINIC | Age: 25
End: 2025-10-21
Payer: COMMERCIAL